# Patient Record
Sex: MALE | Race: WHITE | NOT HISPANIC OR LATINO | URBAN - METROPOLITAN AREA
[De-identification: names, ages, dates, MRNs, and addresses within clinical notes are randomized per-mention and may not be internally consistent; named-entity substitution may affect disease eponyms.]

---

## 2019-03-15 ENCOUNTER — OFFICE (OUTPATIENT)
Dept: URBAN - METROPOLITAN AREA CLINIC 75 | Facility: CLINIC | Age: 39
End: 2019-03-15
Payer: COMMERCIAL

## 2019-03-15 VITALS
WEIGHT: 157 LBS | HEIGHT: 69 IN | DIASTOLIC BLOOD PRESSURE: 68 MMHG | HEART RATE: 95 BPM | SYSTOLIC BLOOD PRESSURE: 106 MMHG

## 2019-03-15 DIAGNOSIS — K21.9 GASTRO-ESOPHAGEAL REFLUX DISEASE WITHOUT ESOPHAGITIS: ICD-10-CM

## 2019-03-15 DIAGNOSIS — R19.7 DIARRHEA, UNSPECIFIED: ICD-10-CM

## 2019-03-15 DIAGNOSIS — R14.3 FLATULENCE: ICD-10-CM

## 2019-03-15 DIAGNOSIS — R19.4 CHANGE IN BOWEL HABIT: ICD-10-CM

## 2019-03-15 PROCEDURE — 99204 OFFICE O/P NEW MOD 45 MIN: CPT | Performed by: INTERNAL MEDICINE

## 2019-12-31 ENCOUNTER — APPOINTMENT (OUTPATIENT)
Dept: GENERAL RADIOLOGY | Facility: HOSPITAL | Age: 39
End: 2019-12-31

## 2019-12-31 ENCOUNTER — HOSPITAL ENCOUNTER (EMERGENCY)
Facility: HOSPITAL | Age: 39
Discharge: HOME | End: 2019-12-31
Attending: EMERGENCY MEDICINE | Admitting: EMERGENCY MEDICINE

## 2019-12-31 VITALS
TEMPERATURE: 97.9 F | RESPIRATION RATE: 16 BRPM | SYSTOLIC BLOOD PRESSURE: 153 MMHG | DIASTOLIC BLOOD PRESSURE: 92 MMHG | HEART RATE: 90 BPM | OXYGEN SATURATION: 98 %

## 2019-12-31 DIAGNOSIS — R11.2 NON-INTRACTABLE VOMITING WITH NAUSEA, UNSPECIFIED VOMITING TYPE: ICD-10-CM

## 2019-12-31 DIAGNOSIS — E11.65 POORLY CONTROLLED DIABETES MELLITUS (HCC): ICD-10-CM

## 2019-12-31 DIAGNOSIS — R10.13 EPIGASTRIC ABDOMINAL PAIN: Primary | ICD-10-CM

## 2019-12-31 LAB
ALBUMIN SERPL-MCNC: 4.7 G/DL (ref 3.5–5.2)
ALBUMIN/GLOB SERPL: 1.4 G/DL
ALP SERPL-CCNC: 72 U/L (ref 39–117)
ALT SERPL W P-5'-P-CCNC: 10 U/L (ref 1–41)
ANION GAP SERPL CALCULATED.3IONS-SCNC: 16.1 MMOL/L (ref 5–15)
AST SERPL-CCNC: 11 U/L (ref 1–40)
BASOPHILS # BLD AUTO: 0.08 10*3/MM3 (ref 0–0.2)
BASOPHILS NFR BLD AUTO: 0.5 % (ref 0–1.5)
BILIRUB SERPL-MCNC: 1 MG/DL (ref 0.2–1.2)
BUN BLD-MCNC: 15 MG/DL (ref 6–20)
BUN/CREAT SERPL: 18.8 (ref 7–25)
CALCIUM SPEC-SCNC: 9.5 MG/DL (ref 8.6–10.5)
CHLORIDE SERPL-SCNC: 92 MMOL/L (ref 98–107)
CO2 SERPL-SCNC: 25.9 MMOL/L (ref 22–29)
CREAT BLD-MCNC: 0.8 MG/DL (ref 0.76–1.27)
DEPRECATED RDW RBC AUTO: 37 FL (ref 37–54)
EOSINOPHIL # BLD AUTO: 0.06 10*3/MM3 (ref 0–0.4)
EOSINOPHIL NFR BLD AUTO: 0.3 % (ref 0.3–6.2)
ERYTHROCYTE [DISTWIDTH] IN BLOOD BY AUTOMATED COUNT: 11.6 % (ref 12.3–15.4)
GFR SERPL CREATININE-BSD FRML MDRD: 107 ML/MIN/1.73
GLOBULIN UR ELPH-MCNC: 3.3 GM/DL
GLUCOSE BLD-MCNC: 289 MG/DL (ref 65–99)
HCT VFR BLD AUTO: 47.8 % (ref 37.5–51)
HGB BLD-MCNC: 16.6 G/DL (ref 13–17.7)
HOLD SPECIMEN: NORMAL
HOLD SPECIMEN: NORMAL
IMM GRANULOCYTES # BLD AUTO: 0.13 10*3/MM3 (ref 0–0.05)
IMM GRANULOCYTES NFR BLD AUTO: 0.7 % (ref 0–0.5)
LIPASE SERPL-CCNC: 11 U/L (ref 13–60)
LYMPHOCYTES # BLD AUTO: 1.7 10*3/MM3 (ref 0.7–3.1)
LYMPHOCYTES NFR BLD AUTO: 9.6 % (ref 19.6–45.3)
MCH RBC QN AUTO: 31 PG (ref 26.6–33)
MCHC RBC AUTO-ENTMCNC: 34.7 G/DL (ref 31.5–35.7)
MCV RBC AUTO: 89.2 FL (ref 79–97)
MONOCYTES # BLD AUTO: 1.36 10*3/MM3 (ref 0.1–0.9)
MONOCYTES NFR BLD AUTO: 7.7 % (ref 5–12)
NEUTROPHILS # BLD AUTO: 14.36 10*3/MM3 (ref 1.7–7)
NEUTROPHILS NFR BLD AUTO: 81.2 % (ref 42.7–76)
NRBC BLD AUTO-RTO: 0 /100 WBC (ref 0–0.2)
PLATELET # BLD AUTO: 174 10*3/MM3 (ref 140–450)
PMV BLD AUTO: 12.1 FL (ref 6–12)
POTASSIUM BLD-SCNC: 3.8 MMOL/L (ref 3.5–5.2)
PROT SERPL-MCNC: 8 G/DL (ref 6–8.5)
RBC # BLD AUTO: 5.36 10*6/MM3 (ref 4.14–5.8)
SODIUM BLD-SCNC: 134 MMOL/L (ref 136–145)
TROPONIN T SERPL-MCNC: <0.01 NG/ML (ref 0–0.03)
TROPONIN T SERPL-MCNC: <0.01 NG/ML (ref 0–0.03)
WBC NRBC COR # BLD: 17.69 10*3/MM3 (ref 3.4–10.8)
WHOLE BLOOD HOLD SPECIMEN: NORMAL
WHOLE BLOOD HOLD SPECIMEN: NORMAL

## 2019-12-31 PROCEDURE — 84484 ASSAY OF TROPONIN QUANT: CPT

## 2019-12-31 PROCEDURE — 93010 ELECTROCARDIOGRAM REPORT: CPT | Performed by: INTERNAL MEDICINE

## 2019-12-31 PROCEDURE — 93005 ELECTROCARDIOGRAM TRACING: CPT

## 2019-12-31 PROCEDURE — 25010000002 ONDANSETRON PER 1 MG: Performed by: EMERGENCY MEDICINE

## 2019-12-31 PROCEDURE — 96374 THER/PROPH/DIAG INJ IV PUSH: CPT

## 2019-12-31 PROCEDURE — 36415 COLL VENOUS BLD VENIPUNCTURE: CPT

## 2019-12-31 PROCEDURE — 96375 TX/PRO/DX INJ NEW DRUG ADDON: CPT

## 2019-12-31 PROCEDURE — 84484 ASSAY OF TROPONIN QUANT: CPT | Performed by: EMERGENCY MEDICINE

## 2019-12-31 PROCEDURE — 99283 EMERGENCY DEPT VISIT LOW MDM: CPT

## 2019-12-31 PROCEDURE — 80053 COMPREHEN METABOLIC PANEL: CPT

## 2019-12-31 PROCEDURE — 71046 X-RAY EXAM CHEST 2 VIEWS: CPT

## 2019-12-31 PROCEDURE — 93005 ELECTROCARDIOGRAM TRACING: CPT | Performed by: EMERGENCY MEDICINE

## 2019-12-31 PROCEDURE — 85025 COMPLETE CBC W/AUTO DIFF WBC: CPT

## 2019-12-31 PROCEDURE — 83690 ASSAY OF LIPASE: CPT | Performed by: EMERGENCY MEDICINE

## 2019-12-31 RX ORDER — FAMOTIDINE 10 MG/ML
20 INJECTION, SOLUTION INTRAVENOUS ONCE
Status: COMPLETED | OUTPATIENT
Start: 2019-12-31 | End: 2019-12-31

## 2019-12-31 RX ORDER — SODIUM CHLORIDE 0.9 % (FLUSH) 0.9 %
10 SYRINGE (ML) INJECTION AS NEEDED
Status: DISCONTINUED | OUTPATIENT
Start: 2019-12-31 | End: 2019-12-31 | Stop reason: HOSPADM

## 2019-12-31 RX ORDER — LIDOCAINE HYDROCHLORIDE 20 MG/ML
15 SOLUTION OROPHARYNGEAL ONCE
Status: COMPLETED | OUTPATIENT
Start: 2019-12-31 | End: 2019-12-31

## 2019-12-31 RX ORDER — ASPIRIN 325 MG
325 TABLET ORAL ONCE
Status: DISCONTINUED | OUTPATIENT
Start: 2019-12-31 | End: 2019-12-31 | Stop reason: HOSPADM

## 2019-12-31 RX ORDER — ONDANSETRON 2 MG/ML
4 INJECTION INTRAMUSCULAR; INTRAVENOUS ONCE
Status: COMPLETED | OUTPATIENT
Start: 2019-12-31 | End: 2019-12-31

## 2019-12-31 RX ORDER — ALUMINA, MAGNESIA, AND SIMETHICONE 2400; 2400; 240 MG/30ML; MG/30ML; MG/30ML
15 SUSPENSION ORAL ONCE
Status: COMPLETED | OUTPATIENT
Start: 2019-12-31 | End: 2019-12-31

## 2019-12-31 RX ORDER — METOCLOPRAMIDE 5 MG/1
5 TABLET ORAL 3 TIMES DAILY PRN
Qty: 30 TABLET | Refills: 0 | Status: SHIPPED | OUTPATIENT
Start: 2019-12-31 | End: 2020-04-18

## 2019-12-31 RX ADMIN — FAMOTIDINE 20 MG: 10 INJECTION INTRAVENOUS at 14:36

## 2019-12-31 RX ADMIN — SODIUM CHLORIDE 1000 ML: 9 INJECTION, SOLUTION INTRAVENOUS at 14:33

## 2019-12-31 RX ADMIN — ONDANSETRON 4 MG: 2 INJECTION INTRAMUSCULAR; INTRAVENOUS at 14:36

## 2019-12-31 RX ADMIN — ALUMINUM HYDROXIDE, MAGNESIUM HYDROXIDE, AND DIMETHICONE: 400; 400; 40 SUSPENSION ORAL at 15:52

## 2019-12-31 RX ADMIN — LIDOCAINE HYDROCHLORIDE 15 ML: 20 SOLUTION ORAL; TOPICAL at 15:52

## 2022-03-16 ENCOUNTER — INPATIENT HOSPITAL (OUTPATIENT)
Dept: URBAN - METROPOLITAN AREA HOSPITAL 107 | Facility: HOSPITAL | Age: 42
End: 2022-03-16
Payer: COMMERCIAL

## 2022-03-16 DIAGNOSIS — K21.00 GASTRO-ESOPHAGEAL REFLUX DISEASE WITH ESOPHAGITIS, WITHOUT B: ICD-10-CM

## 2022-03-16 DIAGNOSIS — K22.10 ULCER OF ESOPHAGUS WITHOUT BLEEDING: ICD-10-CM

## 2022-03-16 DIAGNOSIS — K29.80 DUODENITIS WITHOUT BLEEDING: ICD-10-CM

## 2022-03-16 DIAGNOSIS — K29.50 UNSPECIFIED CHRONIC GASTRITIS WITHOUT BLEEDING: ICD-10-CM

## 2022-03-16 DIAGNOSIS — R13.10 DYSPHAGIA, UNSPECIFIED: ICD-10-CM

## 2022-03-16 DIAGNOSIS — K44.9 DIAPHRAGMATIC HERNIA WITHOUT OBSTRUCTION OR GANGRENE: ICD-10-CM

## 2022-03-16 PROCEDURE — 43249 ESOPH EGD DILATION <30 MM: CPT | Performed by: INTERNAL MEDICINE

## 2022-03-16 PROCEDURE — 43239 EGD BIOPSY SINGLE/MULTIPLE: CPT | Performed by: INTERNAL MEDICINE

## 2023-01-10 ENCOUNTER — HOSPITAL ENCOUNTER (EMERGENCY)
Facility: HOSPITAL | Age: 43
Discharge: HOME | End: 2023-01-10
Attending: EMERGENCY MEDICINE | Admitting: EMERGENCY MEDICINE
Payer: COMMERCIAL

## 2023-01-10 ENCOUNTER — APPOINTMENT (OUTPATIENT)
Dept: GENERAL RADIOLOGY | Facility: HOSPITAL | Age: 43
End: 2023-01-10
Payer: COMMERCIAL

## 2023-01-10 VITALS
WEIGHT: 145 LBS | OXYGEN SATURATION: 99 % | TEMPERATURE: 97.1 F | SYSTOLIC BLOOD PRESSURE: 136 MMHG | HEIGHT: 69 IN | RESPIRATION RATE: 17 BRPM | DIASTOLIC BLOOD PRESSURE: 84 MMHG | HEART RATE: 90 BPM | BODY MASS INDEX: 21.48 KG/M2

## 2023-01-10 DIAGNOSIS — M25.511 ACUTE PAIN OF RIGHT SHOULDER: Primary | ICD-10-CM

## 2023-01-10 PROCEDURE — 99282 EMERGENCY DEPT VISIT SF MDM: CPT

## 2023-01-10 PROCEDURE — 73030 X-RAY EXAM OF SHOULDER: CPT

## 2023-01-10 RX ORDER — MELOXICAM 15 MG/1
15 TABLET ORAL DAILY
Qty: 15 TABLET | Refills: 0 | Status: SHIPPED | OUTPATIENT
Start: 2023-01-10

## 2023-01-10 NOTE — DISCHARGE INSTRUCTIONS
Ice the area of pain 2-3 times daily for 15 minutes  Take the medication as prescribed  Follow-up with orthopedics

## 2023-01-10 NOTE — ED PROVIDER NOTES
MD ATTESTATION NOTE    The SHER and I have discussed this patient's history, physical exam, and treatment plan.  I have reviewed the documentation and personally had a face to face interaction with the patient. I affirm the documentation and agree with the treatment and plan.  The attached note describes my personal findings.      I provided a substantive portion of the care of the patient.  I personally performed the physical exam in its entirety, and below are my findings.  For this patient encounter, the patient wore surgical mask, I wore full protective PPE including N95 and eye protection    Brief HPI: 42-year-old male who presents to the emergency room today for 2 months of right shoulder pain that radiates down to his right wrist.  He states he has had an x-ray that has been negative.  He states the pain has become progressively worse.  The patient denies weakness, numbness, tingling or difficulty moving.  The patient denies known injury.  The patient denies fevers or chills.    General : 42-year-old patient is awake alert and oriented in no acute distress  HEENT: NCAT  Ext: No acute abnormalities: No deformity: The patient has mild right anterior shoulder tenderness that is worsened with movement.  There is no swelling of the joint, redness or fevers or chills patient is neurovascular intact in his right hand.  Skin: No rash  Neuro: Cranial nerves II through XII grossly intact as tested.  No acute lateralizing deficits.  Psych: Normal mood and affect      Plan: Obtain x-rays    Patient's x-rays are unremarkable    We will treat the patient with NSAIDs, sling and orthopedics follow-up as an outpatient.  We have discussed the above with the patient and he understands and agrees the plan.       Cisco Malcolm MD  01/10/23 8347

## 2023-01-10 NOTE — ED PROVIDER NOTES
EMERGENCY DEPARTMENT ENCOUNTER    Room Number:  S01/01  Date seen:  1/10/2023  PCP: Provider, No Known  Discussed/ obtained information from independent historians: Patient      HPI:  Chief Complaint: Right shoulder pain  A complete HPI/ROS/PMH/PSH/SH/FH are unobtainable due to: None  Context: Raghav Loco is a 42 y.o. male who presents to the ED c/o 3-month history of right shoulder pain that is worse with range of motion and radiates down his arm.  Is not exertional or pleuritic.  He states he was seen a couple weeks ago at another ER and had x-rays which were normal.  He states he was prescribed no medications and has not followed up.  His girlfriend is currently admitted to the hospital here and while he was here visiting her decided he would have a shoulder recheck.  He denies any numbness tingling or weakness, any trauma.  He receives disability, is unemployed, no repetitive motions or heavy lifting.  Denies chest pain or shortness of breath.      External (non-ED) record review: Patient evaluated at [facility] for right shoulder pain on 11/10/2022.  X-rays were unremarkable and he was prescribed a 2-week supply of naproxen 500 mg.      PAST MEDICAL HISTORY  Active Ambulatory Problems     Diagnosis Date Noted   • No Active Ambulatory Problems     Resolved Ambulatory Problems     Diagnosis Date Noted   • No Resolved Ambulatory Problems     Past Medical History:   Diagnosis Date   • Coronary artery disease    • Diabetes mellitus (CMS/HCC)          PAST SURGICAL HISTORY  Past Surgical History:   Procedure Laterality Date   • CORONARY ANGIOPLASTY WITH STENT PLACEMENT           FAMILY HISTORY  No family history on file.      SOCIAL HISTORY  Social History     Socioeconomic History   • Marital status: Single   Tobacco Use   • Smoking status: Every Day   • Smokeless tobacco: Never   Substance and Sexual Activity   • Alcohol use: Never   • Drug use: Never          ALLERGIES  Patient has no known allergies.        REVIEW OF SYSTEMS  Review of Systems         PHYSICAL EXAM  ED Triage Vitals [01/10/23 1527]   Temp Heart Rate Resp BP SpO2   97.1 °F (36.2 °C) 90 17 136/84 99 %      Temp src Heart Rate Source Patient Position BP Location FiO2 (%)   -- -- -- -- --       Physical Exam      GENERAL: no acute distress well-appearing  HENT: normocephalic, atraumatic  EYES: no scleral icterus  CV: regular rhythm, normal rate  RESPIRATORY: normal effort, CTA B  ABDOMEN: nondistended  MUSCULOSKELETAL: no deformity.  No tenderness can be elicited about the right shoulder on examination.  He has pain with cross body abduction and internal rotation, external rotation is much more comfortable. Sensation is intact to light touch in radial, ulnar, and median nerve distributions. There is strong finger abduction, thumb and pinky adduction, and wrist extension. Radial and ulnar pulses are 2+ and equal bilaterally.    NEURO: alert, moves all extremities, follows commands  PSYCH:  calm, cooperative  SKIN: warm, dry    Vital signs and nursing notes reviewed.        RADIOLOGY  XR Shoulder 2+ View Right    Result Date: 1/10/2023  XR SHOULDER 2+ VW RIGHT-  INDICATIONS: Shoulder pain  TECHNIQUE: 2 views of the right shoulder  COMPARISON: None available  FINDINGS:  No acute fracture, erosion, or dislocation is identified. Follow-up/further evaluation can be obtained as patient persist.       As described.  This report was finalized on 1/10/2023 3:45 PM by Dr. Matteo Guzman M.D.        Ordered the above noted radiological studies. Reviewed by me in PACS.            PROCEDURES  Procedures              MEDICATIONS GIVEN IN ER  Medications - No data to display                MEDICAL DECISION MAKING, PROGRESS, and CONSULTS    All labs have been independently reviewed by me.  All radiology studies have been reviewed by me and I have also reviewed the radiology report.   EKG's independently viewed  and interpreted by me.  Discussion below represents my analysis of pertinent findings related to patient's condition, differential diagnosis, treatment plan and final disposition.      Additional sources:    - Chronic or social conditions impacting care: none      Orders placed during this visit:  Orders Placed This Encounter   Procedures   • XR Shoulder 2+ View Right         Differential diagnosis:  Tendinitis, rotator cuff pathology, muscle strain, joint sprain, osteoarthritis, bursitis      Independent interpretation of labs, radiology studies, and discussions with consultants:  ED Course as of 01/10/23 1755   Tue Erick 10, 2023   1746 My independent interpretation of the right shoulder x-rays is no acute fracture or dislocation [KA]   1746 Reassessed the patient, counseled him about his x-ray results.  I recommended an anti-inflammatory and will prescribe a course of Mobic.  Additionally he should follow-up with orthopedics for definitive treatment of this problem.  I suspect a tendinitis, could have a rotator cuff injury with the way it radiates down his arm.  It is reproducible with range of motion suggestive of a musculoskeletal source.  He is agreeable with the plan and stable for discharge. [KA]      ED Course User Index  [KA] Azucena Swenson PA             Patient was wearing a face mask when I entered the room and they continued to wear a mask throughout their stay in the ED.  I wore PPE, including  gloves, face mask with shield or face mask with goggles whenever I was in the room with patient.     DIAGNOSIS  Final diagnoses:   Acute pain of right shoulder           Follow Up:  Vinnie Rodrigez II, MD  [address]  [place] [address]  [phone]    Schedule an appointment as soon as possible for a visit in 1 week        RX:     Medication List      New Prescriptions    meloxicam 15 MG tablet  Commonly known as: MOBIC  Take 1 tablet by mouth Daily.           Where to Get Your  Medications      These medications were sent to [facility] [id number] - [place], [place] - [address] - [phone] PH - [phone] FX  [address], [facility] [address]    Phone: [phone]   · meloxicam 15 MG tablet         Latest Documented Vital Signs:  As of 17:55 EST  BP- 136/84 HR- 90 Temp- 97.1 °F (36.2 °C) O2 sat- 99%              --    Please note that portions of this were completed with a voice recognition program.       Note Disclaimer: At [facility], we believe that sharing information builds trust and better relationships. You are receiving this note because you are receiving care at [facility] or recently visited. It is possible you will see health information before a provider has talked with you about it. This kind of information can be easy to misunderstand. To help you fully understand what it means for your health, we urge you to discuss this note with your provider.           Azucena Swenson PA  01/10/23 3153

## 2023-01-10 NOTE — ED TRIAGE NOTES
Pt to er via pv with c/o right shoulder that radiates down arm to wrist x2 months. Pt states he has already gotten xray that didn't show anything wrong. Pt states pain is starting to become unbearable.     Pt and RN wearing mask throughout encounter.

## 2024-04-03 ENCOUNTER — APPOINTMENT (OUTPATIENT)
Dept: CT IMAGING | Facility: HOSPITAL | Age: 44
DRG: 064 | End: 2024-04-03
Payer: COMMERCIAL

## 2024-04-03 ENCOUNTER — HOSPITAL ENCOUNTER (INPATIENT)
Facility: HOSPITAL | Age: 44
LOS: 4 days | Discharge: HOME | DRG: 064 | End: 2024-04-07
Attending: EMERGENCY MEDICINE | Admitting: INTERNAL MEDICINE
Payer: COMMERCIAL

## 2024-04-03 DIAGNOSIS — I63.9 CEREBELLAR STROKE, ACUTE: Primary | ICD-10-CM

## 2024-04-03 LAB
ALBUMIN SERPL-MCNC: 4.7 G/DL (ref 3.5–5.2)
ALBUMIN/GLOB SERPL: 1.7 G/DL
ALP SERPL-CCNC: 66 U/L (ref 39–117)
ALT SERPL W P-5'-P-CCNC: 15 U/L (ref 1–41)
ANION GAP SERPL CALCULATED.3IONS-SCNC: 9.4 MMOL/L (ref 5–15)
APTT PPP: 26.9 SECONDS (ref 22.7–35.4)
AST SERPL-CCNC: 11 U/L (ref 1–40)
BASOPHILS # BLD AUTO: 0.09 10*3/MM3 (ref 0–0.2)
BASOPHILS NFR BLD AUTO: 1 % (ref 0–1.5)
BILIRUB SERPL-MCNC: 0.7 MG/DL (ref 0–1.2)
BUN SERPL-MCNC: 12 MG/DL (ref 6–20)
BUN/CREAT SERPL: 13.6 (ref 7–25)
CALCIUM SPEC-SCNC: 9.5 MG/DL (ref 8.6–10.5)
CHLORIDE SERPL-SCNC: 102 MMOL/L (ref 98–107)
CO2 SERPL-SCNC: 22.6 MMOL/L (ref 22–29)
CREAT SERPL-MCNC: 0.88 MG/DL (ref 0.76–1.27)
DEPRECATED RDW RBC AUTO: 38.9 FL (ref 37–54)
EGFRCR SERPLBLD CKD-EPI 2021: 108.1 ML/MIN/1.73
EOSINOPHIL # BLD AUTO: 0.34 10*3/MM3 (ref 0–0.4)
EOSINOPHIL NFR BLD AUTO: 3.7 % (ref 0.3–6.2)
ERYTHROCYTE [DISTWIDTH] IN BLOOD BY AUTOMATED COUNT: 11.8 % (ref 12.3–15.4)
FLUAV RNA RESP QL NAA+PROBE: NOT DETECTED
FLUBV RNA RESP QL NAA+PROBE: NOT DETECTED
GLOBULIN UR ELPH-MCNC: 2.8 GM/DL
GLUCOSE BLDC GLUCOMTR-MCNC: 215 MG/DL (ref 70–130)
GLUCOSE BLDC GLUCOMTR-MCNC: 268 MG/DL (ref 70–130)
GLUCOSE SERPL-MCNC: 259 MG/DL (ref 65–99)
HCT VFR BLD AUTO: 48.8 % (ref 37.5–51)
HGB BLD-MCNC: 16.2 G/DL (ref 13–17.7)
IMM GRANULOCYTES # BLD AUTO: 0.03 10*3/MM3 (ref 0–0.05)
IMM GRANULOCYTES NFR BLD AUTO: 0.3 % (ref 0–0.5)
INR PPP: 1.1 (ref 0.9–1.1)
LYMPHOCYTES # BLD AUTO: 1.79 10*3/MM3 (ref 0.7–3.1)
LYMPHOCYTES NFR BLD AUTO: 19.3 % (ref 19.6–45.3)
MAGNESIUM SERPL-MCNC: 2.1 MG/DL (ref 1.6–2.6)
MCH RBC QN AUTO: 29.9 PG (ref 26.6–33)
MCHC RBC AUTO-ENTMCNC: 33.2 G/DL (ref 31.5–35.7)
MCV RBC AUTO: 90.2 FL (ref 79–97)
MONOCYTES # BLD AUTO: 1.06 10*3/MM3 (ref 0.1–0.9)
MONOCYTES NFR BLD AUTO: 11.4 % (ref 5–12)
NEUTROPHILS NFR BLD AUTO: 5.96 10*3/MM3 (ref 1.7–7)
NEUTROPHILS NFR BLD AUTO: 64.3 % (ref 42.7–76)
NRBC BLD AUTO-RTO: 0 /100 WBC (ref 0–0.2)
PLATELET # BLD AUTO: 170 10*3/MM3 (ref 140–450)
PMV BLD AUTO: 12.2 FL (ref 6–12)
POTASSIUM SERPL-SCNC: 3.7 MMOL/L (ref 3.5–5.2)
PROT SERPL-MCNC: 7.5 G/DL (ref 6–8.5)
PROTHROMBIN TIME: 14.5 SECONDS (ref 11.7–14.2)
QT INTERVAL: 387 MS
QTC INTERVAL: 430 MS
RBC # BLD AUTO: 5.41 10*6/MM3 (ref 4.14–5.8)
RSV RNA RESP QL NAA+PROBE: NOT DETECTED
SARS-COV-2 RNA RESP QL NAA+PROBE: NOT DETECTED
SODIUM SERPL-SCNC: 134 MMOL/L (ref 136–145)
WBC NRBC COR # BLD AUTO: 9.27 10*3/MM3 (ref 3.4–10.8)

## 2024-04-03 PROCEDURE — 80053 COMPREHEN METABOLIC PANEL: CPT | Performed by: PHYSICIAN ASSISTANT

## 2024-04-03 PROCEDURE — 25010000002 METOCLOPRAMIDE PER 10 MG: Performed by: PHYSICIAN ASSISTANT

## 2024-04-03 PROCEDURE — 99291 CRITICAL CARE FIRST HOUR: CPT

## 2024-04-03 PROCEDURE — 85730 THROMBOPLASTIN TIME PARTIAL: CPT | Performed by: PHYSICIAN ASSISTANT

## 2024-04-03 PROCEDURE — 87637 SARSCOV2&INF A&B&RSV AMP PRB: CPT | Performed by: PHYSICIAN ASSISTANT

## 2024-04-03 PROCEDURE — 93005 ELECTROCARDIOGRAM TRACING: CPT | Performed by: EMERGENCY MEDICINE

## 2024-04-03 PROCEDURE — 83735 ASSAY OF MAGNESIUM: CPT | Performed by: PHYSICIAN ASSISTANT

## 2024-04-03 PROCEDURE — 70496 CT ANGIOGRAPHY HEAD: CPT

## 2024-04-03 PROCEDURE — 70498 CT ANGIOGRAPHY NECK: CPT

## 2024-04-03 PROCEDURE — 25010000002 DIPHENHYDRAMINE PER 50 MG: Performed by: PHYSICIAN ASSISTANT

## 2024-04-03 PROCEDURE — 93010 ELECTROCARDIOGRAM REPORT: CPT | Performed by: INTERNAL MEDICINE

## 2024-04-03 PROCEDURE — 85610 PROTHROMBIN TIME: CPT | Performed by: PHYSICIAN ASSISTANT

## 2024-04-03 PROCEDURE — 85025 COMPLETE CBC W/AUTO DIFF WBC: CPT | Performed by: PHYSICIAN ASSISTANT

## 2024-04-03 PROCEDURE — 82948 REAGENT STRIP/BLOOD GLUCOSE: CPT

## 2024-04-03 PROCEDURE — 25010000002 KETOROLAC TROMETHAMINE PER 15 MG: Performed by: PHYSICIAN ASSISTANT

## 2024-04-03 PROCEDURE — 70450 CT HEAD/BRAIN W/O DYE: CPT

## 2024-04-03 PROCEDURE — 99255 IP/OBS CONSLTJ NEW/EST HI 80: CPT

## 2024-04-03 PROCEDURE — 25510000001 IOPAMIDOL PER 1 ML: Performed by: INTERNAL MEDICINE

## 2024-04-03 RX ORDER — SODIUM CHLORIDE 9 MG/ML
40 INJECTION, SOLUTION INTRAVENOUS AS NEEDED
Status: DISCONTINUED | OUTPATIENT
Start: 2024-04-03 | End: 2024-04-07 | Stop reason: HOSPADM

## 2024-04-03 RX ORDER — ASPIRIN 325 MG
325 TABLET ORAL DAILY
Status: DISCONTINUED | OUTPATIENT
Start: 2024-04-03 | End: 2024-04-06

## 2024-04-03 RX ORDER — METOCLOPRAMIDE HYDROCHLORIDE 5 MG/ML
10 INJECTION INTRAMUSCULAR; INTRAVENOUS ONCE
Status: COMPLETED | OUTPATIENT
Start: 2024-04-03 | End: 2024-04-03

## 2024-04-03 RX ORDER — ASPIRIN 300 MG/1
300 SUPPOSITORY RECTAL DAILY
Status: DISCONTINUED | OUTPATIENT
Start: 2024-04-03 | End: 2024-04-06

## 2024-04-03 RX ORDER — DIPHENHYDRAMINE HYDROCHLORIDE 50 MG/ML
25 INJECTION INTRAMUSCULAR; INTRAVENOUS ONCE
Status: COMPLETED | OUTPATIENT
Start: 2024-04-03 | End: 2024-04-03

## 2024-04-03 RX ORDER — ATORVASTATIN CALCIUM 80 MG/1
80 TABLET, FILM COATED ORAL NIGHTLY
Status: DISCONTINUED | OUTPATIENT
Start: 2024-04-03 | End: 2024-04-07 | Stop reason: HOSPADM

## 2024-04-03 RX ORDER — SODIUM CHLORIDE 0.9 % (FLUSH) 0.9 %
10 SYRINGE (ML) INJECTION AS NEEDED
Status: DISCONTINUED | OUTPATIENT
Start: 2024-04-03 | End: 2024-04-07 | Stop reason: HOSPADM

## 2024-04-03 RX ORDER — SODIUM CHLORIDE 0.9 % (FLUSH) 0.9 %
10 SYRINGE (ML) INJECTION EVERY 12 HOURS SCHEDULED
Status: DISCONTINUED | OUTPATIENT
Start: 2024-04-03 | End: 2024-04-07 | Stop reason: HOSPADM

## 2024-04-03 RX ORDER — KETOROLAC TROMETHAMINE 30 MG/ML
30 INJECTION, SOLUTION INTRAMUSCULAR; INTRAVENOUS ONCE
Status: COMPLETED | OUTPATIENT
Start: 2024-04-03 | End: 2024-04-03

## 2024-04-03 RX ADMIN — Medication 10 ML: at 20:02

## 2024-04-03 RX ADMIN — KETOROLAC TROMETHAMINE 30 MG: 30 INJECTION, SOLUTION INTRAMUSCULAR at 14:44

## 2024-04-03 RX ADMIN — IOPAMIDOL 95 ML: 755 INJECTION, SOLUTION INTRAVENOUS at 17:50

## 2024-04-03 RX ADMIN — DIPHENHYDRAMINE HYDROCHLORIDE 25 MG: 50 INJECTION, SOLUTION INTRAMUSCULAR; INTRAVENOUS at 16:02

## 2024-04-03 RX ADMIN — ASPIRIN 325 MG: 325 TABLET ORAL at 20:00

## 2024-04-03 RX ADMIN — ATORVASTATIN CALCIUM 80 MG: 80 TABLET, FILM COATED ORAL at 20:01

## 2024-04-03 RX ADMIN — METOCLOPRAMIDE 10 MG: 5 INJECTION, SOLUTION INTRAMUSCULAR; INTRAVENOUS at 16:02

## 2024-04-03 NOTE — PLAN OF CARE
Goal Outcome Evaluation:      Pt admitted from ER. NIHSS 0. Denies H/A. Passed [facility] swallow eval, taking reg diet without difficulty. Using urinal independently. Significant other @ [facility].

## 2024-04-03 NOTE — H&P
Patient Care Team:  Provider, No Known as PCP - General    Chief complaint:  Dizziness    History of present illness:  Subjective     This is a 43 year old male patient, CAD s/p stents, HTN, DM II, non compliance.     He reported feeling dizzy described as the room spinning around him, since Nils 3/31 and had associated  posterior headache since then, reaching 10/10 in intensity today.  It was not elevated by the use of ibuprofen.  No exacerbating factors.  His dizziness improved but returned again today along with worsening headache prompting visit to the ED..  He did have an episode of nausea on Monday but no vomiting.  On presentation to the ED, had normal vitals.  Due to his symptoms a CT of the brain was performed and revealed Acute or subacute left cerebellar stroke with mass effect and compression of the fourth ventricle but no hydrocephalus.  Neurosurgery team was contacted and they did not think intervention would be necessary.    We are asked to admit to the intensive care unit for monitoring.    Apparently patient has not been taking his insulin and BP medications for 2 months.  He stated that he moved to a new house few months ago and has not really had the time to take care of his health issues.  He also smokes 1 pack of cigarettes daily and has been smoking for 30 years.    Review of Systems:  Constitutional: No fever or chills.   ENMT: No sinus congestion  Cardiovascular: No chest pain, palpitation or legs swelling.    Respiratory: No dyspnea, cough or wheezing.  Gastrointestinal: No constipation, diarrhea or abdominal pain.  No current nausea or vomiting.  Neurology: See above.  No visual disturbance.  No weakness or numbness.  Musculoskeletal: No joints pain, stiffness or swelling.   Psychiatry: No depression.  Genitourinary: No dysuria or frequent urination  Endo: No weight changes. No cold or warm intolerance.  Lymphatic: No swollen glands.  Integumentary: No rash.    History  Past  Medical History:   Diagnosis Date    Coronary artery disease     Diabetes mellitus      Past Surgical History:   Procedure Laterality Date    CORONARY ANGIOPLASTY WITH STENT PLACEMENT       History reviewed. No pertinent family history.  Social History     Tobacco Use    Smoking status: Every Day     Current packs/day: 1.00     Types: Cigarettes    Smokeless tobacco: Never   Substance Use Topics    Alcohol use: Never    Drug use: Never     E-cigarette/Vaping     E-cigarette/Vaping Substances     Medications Prior to Admission   Medication Sig Dispense Refill Last Dose    meloxicam (MOBIC) 15 MG tablet Take 1 tablet by mouth Daily. 15 tablet 0     ondansetron (ZOFRAN) 4 MG tablet Take 1 tablet by mouth.        Allergies:  Patient has no known allergies.    Objective   Vital Signs  Temp:  [96.7 °F (35.9 °C)] 96.7 °F (35.9 °C)  Heart Rate:  [] 93  Resp:  [16] 16  BP: (123-135)/(85-90) 132/89    PPE used per hospital policy    Physical Exam:  Constitutional: Not in acute distress.  Eyes: Injected conjunctivae, EOMI. Pupils equal and reactive to light.   ENMT: Knight 3. No oral thrush.  Moist tongue.  Neck: No thyromegaly.  Trachea midline  Heart: RRR, no murmur.  No pitting edema  Lungs: Good and equal air entry bilaterally.  Nonlabored breathing  Abdomen: Obese. Soft. No tenderness or dullness.  Positive bowel sounds  Extremities: No cyanosis, clubbing. Moves all extremities.  Warm extremities and well-perfused  Neuro: Conscious, alert, oriented x3.  Strength 5/5 in arms and legs.  Visual field intact.  Finger-to-nose test normal.  Psych: Appropriate mood and affect.    Integumentary: No rash or ecchymosis  Lymphatic: No palpable cervical or supraclavicular lymph nodes.      Diagnostic imaging:  I personally and independently reviewed the following images:   CT brain 4/3/24: Left cerebellar stroke with brain compression.  Findings summarized below in assessment.      Laboratory workup:  Results from last 7 days    Lab Units 04/03/24  1600   SODIUM mmol/L 134*   POTASSIUM mmol/L 3.7   CHLORIDE mmol/L 102   CO2 mmol/L 22.6   BUN mg/dL 12   CREATININE mg/dL 0.88   GLUCOSE mg/dL 259*   CALCIUM mg/dL 9.5         Results from last 7 days   Lab Units 04/03/24  1600   WBC 10*3/mm3 9.27   HEMOGLOBIN g/dL 16.2   HEMATOCRIT % 48.8   PLATELETS 10*3/mm3 170     Results from last 7 days   Lab Units 04/03/24  1600   INR  1.10   APTT seconds 26.9           Assessment     Acute/subacute left cerebellar stroke, left PICA distribution, 4.9 x 2.9 cm  Brain compression with mass effect and compression of the fourth ventricle but no hydrocephalus.    Uncontrolled DM type II, A1c 10.5 on 11/30/2023  HTN  Noncompliance with medical therapy        Plan:  Admit to ICU  Neurocheck per protocol  Check CTA head and neck.  CT brain in a.m. and as needed if change in neurostatus.  Watch for signs of increased intracranial pressure such as nausea/vomiting, regular aspiration and bradycardia.  Check A1c.  And lipid profile.  Initiate insulin subcu as needed for hyperglycemia.  PT OT.        Lavelle Mack MD  04/03/24  16:44 EDT    Time: Critical care 35 min      This note was dictated utilizing Dragon dictation

## 2024-04-03 NOTE — ED TRIAGE NOTES
Patient to ED via PV from home. Patient c/o headache and tingling in left hand. Patient reports he also had a dizzy spell on Sunday but that has resolved.

## 2024-04-03 NOTE — ED PROVIDER NOTES
Pt presents to the ED c/o acute onset of dizziness on Sunday night with posterior headache, has had some nasal congestion, has had some nausea and vomiting last on Monday.  No focal isolated numbness or weakness of the arms or legs.  Denies any balance issues.     On exam,   General: No acute distress, nontoxic  HEENT: Mucous membranes moist, atraumatic, EOMI  Neck: Full ROM  Pulm: Symmetric chest rise, nonlabored, lungs CTAB  Cardiovascular: Regular rate and rhythm, intact distal pulses  GI: Soft, nontender, nondistended, no rebound, no guarding, bowel sounds present  MSK: Full ROM, no deformity  Skin: Warm, dry  Neuro: Awake, alert, oriented x 4, GCS 15, no facial droop or dysarthria or aphasia, moving all extremities, no focal deficits  Psych: Calm, cooperative      EKG - Per my independent interpretation at 1700:    EKG Time: 1654  Rhythm/Rate: Sinus rhythm with a rate of 74  Normal axis  Normal intervals  No acute ischemic changes  No STEMI       No emergent changes compared to December 31, 2019      Plan: Initial concern for viral process, intracranial hemorrhage, TIA or CVA, dehydration, renal failure, electrolyte normalities, among others.  Plan for labs, CT head, and reevaluation with results.    ED Course as of 04/03/24 1803   Wed Apr 03, 2024   1429 First Look: Patient presents with symptoms of upper respiratory congestion, diffuse headache that has progressed over the past couple of days.  He has attempted to use ibuprofen without much relief of symptoms.  No reported injury to the head or neck.  Does not take blood thinners.  No numbness or weakness of the face or extremities, slurred speech, visual disturbance, photophobia.  No sick contacts at home.  He does report that his headache is positional and worse when he bends over. [DC]   7936 CT Head Without Contrast  Per my independent interpretation of the CT head, patient has a left posterior area of encephalomalacia of unclear acuity, no obvious acute  intracranial hemorrhage.  Will defer timing and detailed report radiologist. [DC-2]   1555 Preliminary report per Dr. Benton is acute left cerebellar infarct with associated mass effect on the 4th ventricle [MP]   1558 Call placed to stroke neurology [MP]   1606 Dr. Forbes has reviewed CT scan. Recommending admission to ICU. Requesting consult to [facility].  [MP]   1641 I spoke with Tessa with [facility].  She and Dr. Bhatia have reviewed the CT imaging.  They are aware patient is being admitted to ICU and will be available for consult for any change in patient's mental status. [MP]   1642 WBC: 9.27 [DC-2]   1642 Hemoglobin: 16.2 [DC-2]   1642 Glucose(!): 259 [DC-2]   1642 BUN: 12 [DC-2]   1642 Creatinine: 0.88 [DC-2]   1642 Sodium(!): 134 [DC-2]   1642 Potassium: 3.7 [DC-2]   1642 ALT (SGPT): 15 [DC-2]   1642 AST (SGOT): 11 [DC-2]   1642 Alkaline Phosphatase: 66 [DC-2]   1642 Total Bilirubin: 0.7 [DC-2]   1642 RSV, PCR: Not Detected [DC-2]   1642 Influenza B PCR: Not Detected [DC-2]   1642 Influenza A PCR: Not Detected [DC-2]   1642 COVID19: Not Detected [DC-2]   1642 Magnesium: 2.1 [DC-2]   1645 I spoke with Dr. Mack with pulmonology.  Reviewed patient presentation and ED findings.  He agrees to admit patient to an ICU bed. [MP]      ED Course User Index  [DC] Gene Sandoval PA  [DC-2] Harinder Renee MD  [MP] Alissa Nieves PA-C       CT scan today shows an acute cerebellar stroke with edema and mass effect on the fourth ventricle.  Stroke neurology has been consulted, neurosurgery has been consulted per stroke neurology recommendations.  Patient is overall in no acute distress but understands need for intensive care unit stay per neurology recommendations.     MD Attestation Note    SHARED VISIT: This visit was performed by BOTH a physician and an APC. The substantive portion of the medical decision making was performed by this attesting physician who made or approved the management plan and takes responsibility for  patient management. All studies in the APC note (if performed) were independently interpreted by me.                   Harinder Renee MD  04/03/24 6915

## 2024-04-03 NOTE — CONSULTS
[facility]:  Called by ER provider at the request of neurology, Dr. Forbes, to review CTH.  Patient with acute left cerebellar stroke resulting in effacement of the fourth ventricle but no overt hydrocephalus.  May be some slight enlargement of temporal tips.  Patient is awake and alert with only complaint is headache per ER provider.  Discussed with Dr. Bhatia and Andrei.  Nothing acute from neurosurgical standpoint. Agree with ICU observation. Recommend repeat CT head in a.m. (or sooner for neurologic change) and maintain serum sodium >145.  Although we are not seeing the patient formally at this time, we are aware of and readily available if his condition warrants.  Neurology will notify us if needed.

## 2024-04-03 NOTE — ED PROVIDER NOTES
EMERGENCY DEPARTMENT ENCOUNTER  Room Number:  04/04  PCP: Provider, No Known  Independent Historians: Patient      HPI:  Chief Complaint: had concerns including Hypertension, Headache, and Eye Problem.     A complete HPI/ROS/PMH/PSH/SH/FH are unobtainable due to: None    Chronic or social conditions impacting patient care (Social Determinants of Health): None      Context: Raghav Loco is a 43 y.o. male with a medical history of diabetes, hypertension, hyperlipidemia, esophagitis, CAD who presents to the ED c/o acute headache.  Patient reports he had a lightheaded spell on Sunday and laid down for nap.  When he woke up he had posterior headache.  He describes this as a pressure.  Headache has been constant since onset.  Reports associated nasal congestion.  No known sick contacts.  No fall or head injury.  Patient is not anticoagulated.  He admits that he has been noncompliant and is not taking any of his medications.  Patient denies fever, syncope, chest pain, dyspnea, vision loss, unilateral numbness/weakness, or any other systemic complaint.      Review of prior external notes (non-ED) -and- Review of prior external test results outside of this encounter:  Patient seen in office by PCP on 1/17/2023 for diabetes, hypertension, hyperlipidemia.  Reviewed assessment and plan.  Will check labs, refill medications, referrals placed to GI, cardiology, orthopedics, patient will follow-up in 4 weeks.  Reviewed labs collected on 12/2/2023.  CBC with hemoglobin 13.0, BMP with creatinine 0.44.    Prescription drug monitoring program review:     N/A    PAST MEDICAL HISTORY  Active Ambulatory Problems     Diagnosis Date Noted    No Active Ambulatory Problems     Resolved Ambulatory Problems     Diagnosis Date Noted    No Resolved Ambulatory Problems     Past Medical History:   Diagnosis Date    Coronary artery disease     Diabetes mellitus          PAST SURGICAL HISTORY  Past Surgical History:   Procedure Laterality  Date    CORONARY ANGIOPLASTY WITH STENT PLACEMENT           FAMILY HISTORY  History reviewed. No pertinent family history.      SOCIAL HISTORY  Social History     Socioeconomic History    Marital status: Single   Tobacco Use    Smoking status: Every Day    Smokeless tobacco: Never   Substance and Sexual Activity    Alcohol use: Never    Drug use: Never         ALLERGIES  Patient has no known allergies.      REVIEW OF SYSTEMS  Review of Systems   Constitutional:  Negative for chills and fever.   HENT:  Positive for congestion. Negative for ear pain and sore throat.    Respiratory:  Negative for cough and shortness of breath.    Cardiovascular:  Negative for chest pain and palpitations.   Gastrointestinal:  Negative for abdominal pain and vomiting.   Genitourinary:  Negative for dysuria and hematuria.   Musculoskeletal:  Negative for arthralgias and joint swelling.   Skin:  Negative for pallor and rash.   Neurological:  Positive for headaches. Negative for syncope.   Psychiatric/Behavioral:  Negative for confusion and hallucinations.      Included in HPI  All systems reviewed and negative except for those discussed in HPI.      PHYSICAL EXAM    I have reviewed the triage vital signs and nursing notes.    ED Triage Vitals   Temp Heart Rate Resp BP SpO2   04/03/24 1328 04/03/24 1328 04/03/24 1328 04/03/24 1439 04/03/24 1328   96.7 °F (35.9 °C) 111 16 134/89 97 %      Temp src Heart Rate Source Patient Position BP Location FiO2 (%)   -- -- -- -- --              Physical Exam  Constitutional:       General: He is not in acute distress.     Appearance: Normal appearance.   HENT:      Head: Normocephalic and atraumatic.      Nose: Nose normal.      Mouth/Throat:      Mouth: Mucous membranes are moist.   Eyes:      Extraocular Movements: Extraocular movements intact.      Conjunctiva/sclera: Conjunctivae normal.      Pupils: Pupils are equal, round, and reactive to light.   Cardiovascular:      Rate and Rhythm: Normal rate  and regular rhythm.      Pulses: Normal pulses.      Heart sounds: Normal heart sounds.   Pulmonary:      Effort: Pulmonary effort is normal.      Breath sounds: Normal breath sounds.   Abdominal:      General: There is no distension.   Musculoskeletal:         General: Normal range of motion.      Cervical back: Normal range of motion and neck supple.   Skin:     General: Skin is warm.      Capillary Refill: Capillary refill takes less than 2 seconds.   Neurological:      General: No focal deficit present.      Mental Status: He is alert and oriented to person, place, and time.      Comments: Motor strength 5/5 all 4 extremities   Psychiatric:         Mood and Affect: Mood normal.           LAB RESULTS  Recent Results (from the past 24 hour(s))   COVID-19, FLU A/B, RSV PCR 1 HR TAT - Swab, Nasopharynx    Collection Time: 04/03/24  2:38 PM    Specimen: Nasopharynx; Swab   Result Value Ref Range    COVID19 Not Detected Not Detected - Ref. Range    Influenza A PCR Not Detected Not Detected    Influenza B PCR Not Detected Not Detected    RSV, PCR Not Detected Not Detected   Comprehensive Metabolic Panel    Collection Time: 04/03/24  4:00 PM    Specimen: Blood   Result Value Ref Range    Glucose 259 (H) 65 - 99 mg/dL    BUN 12 6 - 20 mg/dL    Creatinine 0.88 0.76 - 1.27 mg/dL    Sodium 134 (L) 136 - 145 mmol/L    Potassium 3.7 3.5 - 5.2 mmol/L    Chloride 102 98 - 107 mmol/L    CO2 22.6 22.0 - 29.0 mmol/L    Calcium 9.5 8.6 - 10.5 mg/dL    Total Protein 7.5 6.0 - 8.5 g/dL    Albumin 4.7 3.5 - 5.2 g/dL    ALT (SGPT) 15 1 - 41 U/L    AST (SGOT) 11 1 - 40 U/L    Alkaline Phosphatase 66 39 - 117 U/L    Total Bilirubin 0.7 0.0 - 1.2 mg/dL    Globulin 2.8 gm/dL    A/G Ratio 1.7 g/dL    BUN/Creatinine Ratio 13.6 7.0 - 25.0    Anion Gap 9.4 5.0 - 15.0 mmol/L    eGFR 108.1 >60.0 mL/min/1.73   Protime-INR    Collection Time: 04/03/24  4:00 PM    Specimen: Blood   Result Value Ref Range    Protime 14.5 (H) 11.7 - 14.2 Seconds     INR 1.10 0.90 - 1.10   aPTT    Collection Time: 04/03/24  4:00 PM    Specimen: Blood   Result Value Ref Range    PTT 26.9 22.7 - 35.4 seconds   Magnesium    Collection Time: 04/03/24  4:00 PM    Specimen: Blood   Result Value Ref Range    Magnesium 2.1 1.6 - 2.6 mg/dL   CBC Auto Differential    Collection Time: 04/03/24  4:00 PM    Specimen: Blood   Result Value Ref Range    WBC 9.27 3.40 - 10.80 10*3/mm3    RBC 5.41 4.14 - 5.80 10*6/mm3    Hemoglobin 16.2 13.0 - 17.7 g/dL    Hematocrit 48.8 37.5 - 51.0 %    MCV 90.2 79.0 - 97.0 fL    MCH 29.9 26.6 - 33.0 pg    MCHC 33.2 31.5 - 35.7 g/dL    RDW 11.8 (L) 12.3 - 15.4 %    RDW-SD 38.9 37.0 - 54.0 fl    MPV 12.2 (H) 6.0 - 12.0 fL    Platelets 170 140 - 450 10*3/mm3    Neutrophil % 64.3 42.7 - 76.0 %    Lymphocyte % 19.3 (L) 19.6 - 45.3 %    Monocyte % 11.4 5.0 - 12.0 %    Eosinophil % 3.7 0.3 - 6.2 %    Basophil % 1.0 0.0 - 1.5 %    Immature Grans % 0.3 0.0 - 0.5 %    Neutrophils, Absolute 5.96 1.70 - 7.00 10*3/mm3    Lymphocytes, Absolute 1.79 0.70 - 3.10 10*3/mm3    Monocytes, Absolute 1.06 (H) 0.10 - 0.90 10*3/mm3    Eosinophils, Absolute 0.34 0.00 - 0.40 10*3/mm3    Basophils, Absolute 0.09 0.00 - 0.20 10*3/mm3    Immature Grans, Absolute 0.03 0.00 - 0.05 10*3/mm3    nRBC 0.0 0.0 - 0.2 /100 WBC         RADIOLOGY  CT Head Without Contrast    Result Date: 4/3/2024  CT HEAD WITHOUT CONTRAST  CLINICAL HISTORY: Headache and nausea  TECHNIQUE: CT scan of the head was obtained with 3 mm axial soft tissue algorithm images. No intravenous contrast was administered. Sagittal and coronal reconstructions were obtained.  COMPARISON: No previous similar studies are available for comparison.  FINDINGS:   There is a hypodensity within the posterior and medial aspect of the left cerebellar hemisphere which measures up to approximately 2.9 x 4.9 cm in greatest axial dimensions and is compatible with an acute to subacute infarct within the left PICA distribution. There is mass effect  upon the fourth ventricle which is effaced. However, there is no evidence for obstructive hydrocephalus at this time. There is no evidence for hemorrhagic transformation.  Otherwise, the ventricles, sulci, and cisterns are age-appropriate. The basal ganglia and thalami are unremarkable in appearance.       There are findings most compatible with an acute to subacute infarct within the posterior and medial aspect of the left cerebellar hemisphere within the left PICA distribution. This measures up to 4.9 x 2.9 cm in greatest axial dimensions. There is mass effect upon the fourth ventricle which is effaced although there is no evidence for obstructive hydrocephalus at this time. These findings were discussed with Alissa Nieves on 04/03/2025 at approximately 3:55 p.m.   Radiation dose reduction techniques were utilized, including automated exposure control and exposure modulation based on body size.         MEDICATIONS GIVEN IN ER  Medications   sodium chloride 0.9 % flush 10 mL (has no administration in time range)   ketorolac (TORADOL) injection 30 mg (30 mg Intramuscular Given 4/3/24 1444)   diphenhydrAMINE (BENADRYL) injection 25 mg (25 mg Intravenous Given 4/3/24 1602)   metoclopramide (REGLAN) injection 10 mg (10 mg Intravenous Given 4/3/24 1602)         ORDERS PLACED DURING THIS VISIT:  Orders Placed This Encounter   Procedures    COVID-19, FLU A/B, RSV PCR 1 HR TAT - Swab, Nasopharynx    CT Head Without Contrast    CT Angiogram Head    CT Angiogram Neck    Comprehensive Metabolic Panel    Protime-INR    aPTT    Magnesium    CBC Auto Differential    Inpatient Neurology Consult Stroke    Neurosurgery (on-call MD unless specified)    Pulmonology (on-call MD unless specified)    ECG 12 Lead Stroke Evaluation    Insert Peripheral IV    Inpatient Admission    CBC & Differential         OUTPATIENT MEDICATION MANAGEMENT:  Current Facility-Administered Medications Ordered in Epic   Medication Dose Route Frequency  Provider Last Rate Last Admin    sodium chloride 0.9 % flush 10 mL  10 mL Intravenous PRN Alissa Nieves PA-C         Current Outpatient Medications Ordered in Epic   Medication Sig Dispense Refill    meloxicam (MOBIC) 15 MG tablet Take 1 tablet by mouth Daily. 15 tablet 0    ondansetron (ZOFRAN) 4 MG tablet Take 1 tablet by mouth.           45 minutes of critical care provided. This time excludes other billable procedures. Time does include preparation of documents, medical consultations, review of old records, and direct bedside care. Patient is at high risk for life-threatening deterioration due to acute stroke requiring frequent neurologic assessment, neurology and neurosurgery consultation, and admission to the ICU.         PROGRESS, DATA ANALYSIS, CONSULTS, AND MEDICAL DECISION MAKING  All labs have been independently interpreted by me.  All radiology studies have been reviewed by me. All EKG's have been independently viewed and interpreted by me.  Discussion below represents my analysis of pertinent findings related to patient's condition, differential diagnosis, treatment plan and final disposition.    Differential diagnosis includes but is not limited to intracranial bleed, viral headache, tension headache, sinus infection.    Clinical Scores:            Total (NIH Stroke Scale): 0      ED Course as of 04/03/24 1646   Wed Apr 03, 2024   7719 First Look: Patient presents with symptoms of upper respiratory congestion, diffuse headache that has progressed over the past couple of days.  He has attempted to use ibuprofen without much relief of symptoms.  No reported injury to the head or neck.  Does not take blood thinners.  No numbness or weakness of the face or extremities, slurred speech, visual disturbance, photophobia.  No sick contacts at home.  He does report that his headache is positional and worse when he bends over. [DC]   9787 CT Head Without Contrast  Per my independent interpretation of the CT  head, patient has a left posterior area of encephalomalacia of unclear acuity, no obvious acute intracranial hemorrhage.  Will defer timing and detailed report radiologist. [DC-2]   1555 Preliminary report per Dr. Benton is acute left cerebellar infarct with associated mass effect on the 4th ventricle [MP]   1558 Call placed to stroke neurology [MP]   1606 Dr. Forbes has reviewed CT scan. Recommending admission to ICU. Requesting consult to [facility].  [MP]   1641 I spoke with Tessa with [facility].  She and Dr. Bhatia have reviewed the CT imaging.  They are aware patient is being admitted to ICU and will be available for consult for any change in patient's mental status. [MP]   1642 WBC: 9.27 [DC-2]   1642 Hemoglobin: 16.2 [DC-2]   1642 Glucose(!): 259 [DC-2]   1642 BUN: 12 [DC-2]   1642 Creatinine: 0.88 [DC-2]   1642 Sodium(!): 134 [DC-2]   1642 Potassium: 3.7 [DC-2]   1642 ALT (SGPT): 15 [DC-2]   1642 AST (SGOT): 11 [DC-2]   1642 Alkaline Phosphatase: 66 [DC-2]   1642 Total Bilirubin: 0.7 [DC-2]   1642 RSV, PCR: Not Detected [DC-2]   1642 Influenza B PCR: Not Detected [DC-2]   1642 Influenza A PCR: Not Detected [DC-2]   1642 COVID19: Not Detected [DC-2]   1642 Magnesium: 2.1 [DC-2]   1645 I spoke with Dr. Ahn with pulmonology.  Reviewed patient presentation and ED findings.  He agrees to admit patient to an ICU bed. [MP]      ED Course User Index  [DC] Gene Sandoval PA  [DC-2] Harinder Renee MD  [MP] Alissa Nieves PA-C             AS OF 16:46 EDT VITALS:    BP - 132/89  HR - 93  TEMP - 96.7 °F (35.9 °C)  O2 SATS - 98%    COMPLEXITY OF CARE  The patient requires admission.      DIAGNOSIS  Final diagnoses:   Cerebellar stroke, acute         DISPOSITION  ED Disposition       ED Disposition   Decision to Admit    Condition   --    Comment   Level of Care: Critical Care [6]   Diagnosis: Acute CVA (cerebrovascular accident) [[id number]]   Admitting Physician: AUNG AHN [[id number]]   Attending Physician: AUNG AHN  [[id number]]   Certification: I Certify That Inpatient Hospital Services Are Medically Necessary For Greater Than 2 Midnights                  Please note that portions of this document were completed with a voice recognition program.    Note Disclaimer: At [facility], we believe that sharing information builds trust and better relationships. You are receiving this note because you recently visited [facility]. It is possible you will see health information before a provider has talked with you about it. This kind of information can be easy to misunderstand. To help you fully understand what it means for your health, we urge you to discuss this note with your provider.         Alissa Nieves PA-C  04/03/24 1809

## 2024-04-03 NOTE — ED NOTES
..Nursing report ED to floor  Raghav Looc  43 y.o.  male    HPI :  HPI (Adult)  Stated Reason for Visit: headache, tingling    Chief Complaint  Chief Complaint   Patient presents with    Hypertension    Headache    Eye Problem       Admitting doctor:   Lavelle Mack MD    Admitting diagnosis:   The encounter diagnosis was Cerebellar stroke, acute.    Code status:   Current Code Status       Date Active Code Status Order ID Comments User Context       Not on file            Allergies:   Patient has no known allergies.    Isolation:   Enhanced Droplet/Contact     Intake and Output  No intake or output data in the 24 hours ending 04/03/24 1710    Weight:       04/03/24  1439   Weight: 63.5 kg (140 lb)       Most recent vitals:   Vitals:    04/03/24 1501 04/03/24 1531 04/03/24 1602 04/03/24 1631   BP: 135/89 125/85 123/90 132/89   Pulse: 84 82 80 93   Resp:       Temp:       SpO2: 95% 96% 99% 98%   Weight:       Height:           Active LDAs/IV Access:   Lines, Drains & Airways       Active LDAs       Name Placement date Placement time Site Days    Peripheral IV 04/03/24 1601 Right Antecubital 04/03/24  1601  Antecubital  less than 1                    Labs (abnormal labs have a star):   Labs Reviewed   COMPREHENSIVE METABOLIC PANEL - Abnormal; Notable for the following components:       Result Value    Glucose 259 (*)     Sodium 134 (*)     All other components within normal limits    Narrative:     GFR Normal >60  Chronic Kidney Disease <60  Kidney Failure <15     PROTIME-INR - Abnormal; Notable for the following components:    Protime 14.5 (*)     All other components within normal limits   CBC WITH AUTO DIFFERENTIAL - Abnormal; Notable for the following components:    RDW 11.8 (*)     MPV 12.2 (*)     Lymphocyte % 19.3 (*)     Monocytes, Absolute 1.06 (*)     All other components within normal limits   COVID-19/FLUA&B/RSV, NP SWAB IN TRANSPORT MEDIA 1 HR TAT - Normal    Narrative:     Fact sheet for  providers: https://www.fda.gov/media/907545/download    Fact sheet for patients: https://www.fda.gov/media/153725/download    Test performed by PCR.   APTT - Normal   MAGNESIUM - Normal   CBC AND DIFFERENTIAL    Narrative:     The following orders were created for panel order CBC & Differential.  Procedure                               Abnormality         Status                     ---------                               -----------         ------                     CBC Auto Differential[[id number]]        Abnormal            Final result                 Please view results for these tests on the individual orders.       EKG:   ECG 12 Lead Stroke Evaluation   Preliminary Result   HEART RATE= 74  bpm   RR Interval= 811  ms   MN Interval= 152  ms   P Horizontal Axis= 3  deg   P Front Axis= 61  deg   QRSD Interval= 85  ms   QT Interval= 387  ms   QTcB= 430  ms   QRS Axis= 54  deg   T Wave Axis= 72  deg   - ABNORMAL ECG -   Sinus rhythm   Probable left atrial enlargement   Probable anteroseptal infarct, old   Electronically Signed By:    Date and Time of Study: 2024-04-03 16:54:24          Meds given in ED:   Medications   sodium chloride 0.9 % flush 10 mL (has no administration in time range)   ketorolac (TORADOL) injection 30 mg (30 mg Intramuscular Given 4/3/24 1444)   diphenhydrAMINE (BENADRYL) injection 25 mg (25 mg Intravenous Given 4/3/24 1602)   metoclopramide (REGLAN) injection 10 mg (10 mg Intravenous Given 4/3/24 1602)       Imaging results:  CT Head Without Contrast    Result Date: 4/3/2024   There are findings most compatible with an acute to subacute infarct within the posterior and medial aspect of the left cerebellar hemisphere within the left PICA distribution. This measures up to 4.9 x 2.9 cm in greatest axial dimensions. There is mass effect upon the fourth ventricle which is effaced although there is no evidence for obstructive hydrocephalus at this time. These findings were discussed with Alissa  [facility] on 04/03/2025 at approximately 3:55 p.m.   Radiation dose reduction techniques were utilized, including automated exposure control and exposure modulation based on body size.       Ambulatory status:   - bed rest     Social issues:   Social History     Socioeconomic History    Marital status: Single   Tobacco Use    Smoking status: Every Day    Smokeless tobacco: Never   Substance and Sexual Activity    Alcohol use: Never    Drug use: Never       Peripheral Neurovascular  Peripheral Neurovascular (Adult)  Peripheral Neurovascular WDL: neurovascular assessment upper, neurovascular assessment lower  Pulse Assessment: radial  LUE Neurovascular Assessment  Color LUE: no discoloration  Sensation LUE: no tingling, no numbness, no tenderness  RUE Neurovascular Assessment  Color RUE: no discoloration  Sensation RUE: no numbness, no tenderness, no tingling  LLE Neurovascular Assessment  Color LLE: no discoloration  Sensation LLE: no numbness, no tenderness, no tingling  RLE Neurovascular Assessment  Color RLE: no discoloration  Sensation RLE: no numbness, no tenderness, no tingling    Neuro Cognitive  Neuro Cognitive (Adult)  Cognitive/Neuro/Behavioral WDL: WDL  Orientation: oriented x 4  Speech: clear, logical  Additional Documentation: Dizziness Assessment (Group), Hand /Ankle Strength (Group), Headache Assessment (Group), Pupils (Group)  Headache Assessment  Headache Location: generalized  Severity Rating (0-10): 0  Description/Character: other (see comments) (pt states he has no headache)  Associated Signs/Symptoms: other (see comments)  Pupils  Pupil PERRLA: yes  Hand /Ankle Strength  Hand , Left: strong  Hand , Right: strong  Dorsiflexion, Left: strong  Dorsiflexion, Right: strong  Plantarflexion, Left: strong  Plantarflexion, Right: strong  NIH Stroke Scale  Interval: baseline  1a. Level of Consciousness: 0-->Alert, keenly responsive  1b. LOC Questions: 0-->Answers both questions correctly  1c.  LOC Commands: 0-->Performs both tasks correctly  2. Best Gaze: 0-->Normal  3. Visual: 0-->No visual loss  4. Facial Palsy: 0-->Normal symmetrical movements  5a. Motor Arm, Left: 0-->No drift, limb holds 90 (or 45) degrees for full 10 secs  5b. Motor Arm, Right: 0-->No drift, limb holds 90 (or 45) degrees for full 10 secs  6a. Motor Leg, Left: 0-->No drift, leg holds 30 degree position for full 5 secs  6b. Motor Leg, Right: 0-->No drift, leg holds 30 degree position for full 5 secs  7. Limb Ataxia: 0-->Absent  8. Sensory: 0-->Normal, no sensory loss  9. Best Language: 0-->No aphasia, normal  10. Dysarthria: 0-->Normal  11. Extinction and Inattention (formerly Neglect): 0-->No abnormality  Total (NIH Stroke Scale): 0  Dizziness Assessment  Dizziness Reported Symptoms:  (no dizziness)    Learning  Learning Assessment (Adult)  Learning Readiness and Ability: no barriers identified    Respiratory  Respiratory WDL  Respiratory WDL: WDL, rhythm/pattern, cough  Rhythm/Pattern, Respiratory: depth regular, pattern regular, unlabored  Cough Frequency: infrequent    Abdominal Pain       Pain Assessments  Pain (Adult)  (0-10) Pain Rating: Rest: 6  (0-10) Pain Rating: Activity: 6    NIH Stroke Scale  NIH Stroke Scale  Interval: baseline  1a. Level of Consciousness: 0-->Alert, keenly responsive  1b. LOC Questions: 0-->Answers both questions correctly  1c. LOC Commands: 0-->Performs both tasks correctly  2. Best Gaze: 0-->Normal  3. Visual: 0-->No visual loss  4. Facial Palsy: 0-->Normal symmetrical movements  5a. Motor Arm, Left: 0-->No drift, limb holds 90 (or 45) degrees for full 10 secs  5b. Motor Arm, Right: 0-->No drift, limb holds 90 (or 45) degrees for full 10 secs  6a. Motor Leg, Left: 0-->No drift, leg holds 30 degree position for full 5 secs  6b. Motor Leg, Right: 0-->No drift, leg holds 30 degree position for full 5 secs  7. Limb Ataxia: 0-->Absent  8. Sensory: 0-->Normal, no sensory loss  9. Best Language: 0-->No  aphasia, normal  10. Dysarthria: 0-->Normal  11. Extinction and Inattention (formerly Neglect): 0-->No abnormality  Total (NIH Stroke Scale): 0    Nikki Neves RN  04/03/24 17:10 EDT

## 2024-04-03 NOTE — CONSULTS
Neurology Consult Note    Consult Date: 4/3/2024    Referring MD: Lavelle Mack MD    Reason for Consult I have been asked to see the patient in neurological consultation to render advice and opinion regarding stroke.    Raghav Loco is a 43 y.o. male with PMH of multiple AMI, diabetes, HTN, HLD presents to the ED with acute headache. Patient reports he had sudden dizziness like the room was spinning on Sunday when he was using the restroom. Patient states that he had a little bit of trouble walking back to his bed to lay down for a nap. When he woke up he had a posterior headache. He describes this as a pressure and states that it has been present since. Patient admits to associated N/V with last episode being last night. Patient denies feeling nauseous currently. Patient admits to intermittent numbness and tingling of his left arm since symptoms started. Patient denies weakness, slurred speech or speaking difficulty, blurry vision, double vision, or vision loss. Patient admits to being non-compliant with all medications. Patient smokes about 1 PPD x 29 years. Patient blood pressure on arrival was 134/89, pulse 111, glucose: 259. Patient denies history of heart arrhthymias, palpitations, chest pain currently or SOA.     Past Medical/Surgical Hx:  Past Medical History:   Diagnosis Date    Coronary artery disease     Diabetes mellitus      Past Surgical History:   Procedure Laterality Date    CORONARY ANGIOPLASTY WITH STENT PLACEMENT         Medications On Admission  Medications Prior to Admission   Medication Sig Dispense Refill Last Dose    meloxicam (MOBIC) 15 MG tablet Take 1 tablet by mouth Daily. 15 tablet 0     ondansetron (ZOFRAN) 4 MG tablet Take 1 tablet by mouth.          Allergies:  No Known Allergies    Social Hx:  Social History     Socioeconomic History    Marital status: Single   Tobacco Use    Smoking status: Every Day    Smokeless tobacco: Never   Substance and Sexual Activity    Alcohol  "use: Never    Drug use: Never       Family Hx:  History reviewed. No pertinent family history.        Exam    /89   Pulse 82   Temp 96.7 °F (35.9 °C)   Resp 16   Ht 172.7 cm (68\")   Wt 63.5 kg (140 lb)   SpO2 98%   BMI 21.29 kg/m²   gen: NAD, vitals reviewed  MS: oriented x3, recent/remote memory intact, normal attention/concentration, language intact, no neglect, normal fund of knowledge  CN: visual acuity grossly normal, visual fields full, PERRL, EOMI, facial sensation equal, no facial droop, hearing symmetric, palate elevates symmetrically, shoulder shrug equal, tongue midline  Motor: 5/5 throughout upper and lower extremities, normal tone  Sensation: intact to light touch and temperature throughout  Coordination: mild dysmetria in left upper extremity and left lower extremity    DATA:    Lab Results   Component Value Date    GLUCOSE 259 (H) 04/03/2024    CALCIUM 9.5 04/03/2024     (L) 04/03/2024    K 3.7 04/03/2024    CO2 22.6 04/03/2024     04/03/2024    BUN 12 04/03/2024    CREATININE 0.88 04/03/2024    EGFRIFAFRI >60 02/21/2023    EGFRIFNONA 107 12/31/2019    BCR 13.6 04/03/2024    ANIONGAP 9.4 04/03/2024     Lab Results   Component Value Date    WBC 9.27 04/03/2024    HGB 16.2 04/03/2024    HCT 48.8 04/03/2024    MCV 90.2 04/03/2024     04/03/2024     Lab Results   Component Value Date    LDL 66 01/09/2019    LDL 85 01/08/2019     (H) 11/24/2018     Lab Results   Component Value Date    HGBA1C 10.5 (H) 11/30/2023     Lab Results   Component Value Date    INR 1.10 04/03/2024    INR 1.0 03/15/2022    INR 1.0 10/29/2021    PROTIME 14.5 (H) 04/03/2024    PROTIME 11.6 03/15/2022    PROTIME 12.2 10/29/2021       Lab review: glucose: 259, sodium 134,     Imaging review:   CT head without contrast:  FINDINGS:    There is a hypodensity within the posterior and medial aspect of the  left cerebellar hemisphere which measures up to approximately 2.9 x 4.9  cm in greatest axial " dimensions and is compatible with an acute to  subacute infarct within the left PICA distribution. There is mass effect  upon the fourth ventricle which is effaced. However, there is no  evidence for obstructive hydrocephalus at this time. There is no  evidence for hemorrhagic transformation.  Otherwise, the ventricles, sulci, and cisterns are age-appropriate. The  basal ganglia and thalami are unremarkable in appearance.  IMPRESSION:  There are findings most compatible with an acute to subacute infarct  within the posterior and medial aspect of the left cerebellar hemisphere  within the left PICA distribution. This measures up to 4.9 x 2.9 cm in  greatest axial dimensions. There is mass effect upon the fourth  ventricle which is effaced although there is no evidence for obstructive  hydrocephalus at this time. These findings were discussed with Alissa Nieves on 04/03/2025 at approximately 3:55 p.m.       CTA head and neck:  CTA NECK: The aortic arch has a classic configuration. There is no  significant great vessel origin stenosis. The vertebral arteries are  unremarkable. There is no significant NASCET stenosis within either  internal carotid artery.  CTA HEAD: The vertebral arteries, basilar artery, and posterior cerebral  arteries are unremarkable in appearance. The internal carotids, middle  cerebral arteries, and anterior cerebral arteries are unremarkable.  Again noted are findings most compatible with an acute to subacute  infarct within the posterior medial aspect of the left cerebellar  hemisphere within the left PICA distribution. This infarct measures up  to approximately 4.9 x 2.9 cm in greatest axial dimensions. Again noted  is effacement of the fourth ventricle. There is no evidence for  hydrocephalus at this time. Similar findings were noted on the previous  exam.  IMPRESSION:  Again noted is an acute to subacute infarct involving the posterior and  medial aspect of the left cerebellar hemisphere  within the left PICA  distribution. There is mass effect upon the fourth ventricle which is  stable when compared to the head CT performed earlier today. Again,  there is no evidence for hydrocephalus at this time.    EKG: probable left atrial enlargement, old anteroseptal infarct    Diagnoses:  Acute left cerebellar stroke  HTN  HLD  Diabetes  CAD  Hyponatremia    Comment: Patient with acute left cerebellar infarct measuring 4.9 x 2.9 cm on CT head without contrast. Patient has had dizziness and gait imbalance since Sunday that has been constant. Patient with persistent posterior pressure headache without any nausea currently but did have associated N/V yesterday. Patient with mild LLE and LUE ataxia on examination. Patient should be admitted to ICU for monitoring with STAT head CT ordered with neurological change. Will repeat head CT without contrast in morning. Neurosurgery consulted and states there is nothing acute from a neurological standpoint but agree with ICU observation and maintenance of serum sodium >145. Neurosurgery should be consulted if warranted.     Pre-stroke mRS: 1  NIHSS: 2    PLAN:   -Admit to ICU for close monitoring, q 1 hour checks, any neurological change, STAT CT  -CT head without contrast tomorrow morning  -MRI brain with and without contrast  -Give aspirin now; if cannot swallow give MT  -Maintain permissive HTN for 24-48hrs, do not treat unless BP >180/105 if no contraindication  -Perform bedside swallow test  -Telemetry to monitor for arrhythmia  -VTE prophylaxis  -Neuro checks, if change in exam call neuro oncall  -PT/OT when appropriate   -TTE    Recommendations discussed with Dr. Forbes and he is in agreement with plan.

## 2024-04-04 ENCOUNTER — APPOINTMENT (OUTPATIENT)
Dept: MRI IMAGING | Facility: HOSPITAL | Age: 44
DRG: 064 | End: 2024-04-04
Payer: COMMERCIAL

## 2024-04-04 ENCOUNTER — APPOINTMENT (OUTPATIENT)
Dept: CT IMAGING | Facility: HOSPITAL | Age: 44
DRG: 064 | End: 2024-04-04
Payer: COMMERCIAL

## 2024-04-04 ENCOUNTER — APPOINTMENT (OUTPATIENT)
Dept: CARDIOLOGY | Facility: HOSPITAL | Age: 44
DRG: 064 | End: 2024-04-04
Payer: COMMERCIAL

## 2024-04-04 LAB
ALBUMIN SERPL-MCNC: 4.2 G/DL (ref 3.5–5.2)
ALBUMIN/GLOB SERPL: 1.8 G/DL
ALP SERPL-CCNC: 60 U/L (ref 39–117)
ALT SERPL W P-5'-P-CCNC: 8 U/L (ref 1–41)
ANION GAP SERPL CALCULATED.3IONS-SCNC: 10.9 MMOL/L (ref 5–15)
AORTIC ARCH: 2.6 CM
AORTIC DIMENSIONLESS INDEX: 0.8 (DI)
ASCENDING AORTA: 2.8 CM
AST SERPL-CCNC: 11 U/L (ref 1–40)
BASOPHILS # BLD AUTO: 0.13 10*3/MM3 (ref 0–0.2)
BASOPHILS NFR BLD AUTO: 1.5 % (ref 0–1.5)
BH CV ECHO LEFT VENTRICLE GLOBAL LONGITUDINAL STRAIN: -8.1 %
BH CV ECHO MEAS - ACS: 1.96 CM
BH CV ECHO MEAS - AO MAX PG: 6.6 MMHG
BH CV ECHO MEAS - AO MEAN PG: 3 MMHG
BH CV ECHO MEAS - AO ROOT DIAM: 2.8 CM
BH CV ECHO MEAS - AO V2 MAX: 128 CM/SEC
BH CV ECHO MEAS - AO V2 VTI: 21.8 CM
BH CV ECHO MEAS - AVA(I,D): 2.6 CM2
BH CV ECHO MEAS - EDV(CUBED): 148.9 ML
BH CV ECHO MEAS - EDV(MOD-SP2): 168 ML
BH CV ECHO MEAS - EDV(MOD-SP4): 143 ML
BH CV ECHO MEAS - EF(MOD-BP): 26.6 %
BH CV ECHO MEAS - EF(MOD-SP2): 31 %
BH CV ECHO MEAS - EF(MOD-SP4): 30.1 %
BH CV ECHO MEAS - ESV(CUBED): 55.3 ML
BH CV ECHO MEAS - ESV(MOD-SP2): 116 ML
BH CV ECHO MEAS - ESV(MOD-SP4): 100 ML
BH CV ECHO MEAS - FS: 28.1 %
BH CV ECHO MEAS - IVS/LVPW: 1.13 CM
BH CV ECHO MEAS - IVSD: 0.9 CM
BH CV ECHO MEAS - LAT PEAK E' VEL: 8.2 CM/SEC
BH CV ECHO MEAS - LV MASS(C)D: 162.1 GRAMS
BH CV ECHO MEAS - LV MAX PG: 4.1 MMHG
BH CV ECHO MEAS - LV MEAN PG: 2 MMHG
BH CV ECHO MEAS - LV V1 MAX: 101 CM/SEC
BH CV ECHO MEAS - LV V1 VTI: 17.1 CM
BH CV ECHO MEAS - LVIDD: 5.3 CM
BH CV ECHO MEAS - LVIDS: 3.8 CM
BH CV ECHO MEAS - LVOT AREA: 3.3 CM2
BH CV ECHO MEAS - LVOT DIAM: 2.04 CM
BH CV ECHO MEAS - LVPWD: 0.8 CM
BH CV ECHO MEAS - MED PEAK E' VEL: 6 CM/SEC
BH CV ECHO MEAS - MV A DUR: 0.11 SEC
BH CV ECHO MEAS - MV A MAX VEL: 63.4 CM/SEC
BH CV ECHO MEAS - MV DEC SLOPE: 362.7 CM/SEC2
BH CV ECHO MEAS - MV DEC TIME: 0.2 SEC
BH CV ECHO MEAS - MV E MAX VEL: 53.5 CM/SEC
BH CV ECHO MEAS - MV E/A: 0.84
BH CV ECHO MEAS - MV MAX PG: 1.92 MMHG
BH CV ECHO MEAS - MV MEAN PG: 0.93 MMHG
BH CV ECHO MEAS - MV P1/2T: 53.4 MSEC
BH CV ECHO MEAS - MV V2 VTI: 17.1 CM
BH CV ECHO MEAS - MVA(P1/2T): 4.1 CM2
BH CV ECHO MEAS - MVA(VTI): 3.3 CM2
BH CV ECHO MEAS - PA ACC TIME: 0.12 SEC
BH CV ECHO MEAS - PA V2 MAX: 97 CM/SEC
BH CV ECHO MEAS - PULM A REVS DUR: 0.1 SEC
BH CV ECHO MEAS - PULM A REVS VEL: 27 CM/SEC
BH CV ECHO MEAS - PULM DIAS VEL: 48.8 CM/SEC
BH CV ECHO MEAS - PULM S/D: 0.85
BH CV ECHO MEAS - PULM SYS VEL: 41.4 CM/SEC
BH CV ECHO MEAS - QP/QS: 0.65
BH CV ECHO MEAS - RAP SYSTOLE: 3 MMHG
BH CV ECHO MEAS - RV MAX PG: 2.28 MMHG
BH CV ECHO MEAS - RV V1 MAX: 75.5 CM/SEC
BH CV ECHO MEAS - RV V1 VTI: 13.2 CM
BH CV ECHO MEAS - RVOT DIAM: 1.87 CM
BH CV ECHO MEAS - RVSP: 9 MMHG
BH CV ECHO MEAS - SUP REN AO DIAM: 2 CM
BH CV ECHO MEAS - SV(LVOT): 56.1 ML
BH CV ECHO MEAS - SV(MOD-SP2): 52 ML
BH CV ECHO MEAS - SV(MOD-SP4): 43 ML
BH CV ECHO MEAS - SV(RVOT): 36.3 ML
BH CV ECHO MEAS - TAPSE (>1.6): 1.85 CM
BH CV ECHO MEAS - TR MAX PG: 5.7 MMHG
BH CV ECHO MEAS - TR MAX VEL: 119.2 CM/SEC
BH CV ECHO MEASUREMENTS AVERAGE E/E' RATIO: 7.54
BH CV ECHO SHUNT ASSESSMENT PERFORMED (HIDDEN SCRIPTING): 1
BH CV XLRA - RV BASE: 2.8 CM
BH CV XLRA - RV LENGTH: 9 CM
BH CV XLRA - RV MID: 2.6 CM
BH CV XLRA - TDI S': 11.4 CM/SEC
BILIRUB SERPL-MCNC: 0.3 MG/DL (ref 0–1.2)
BUN SERPL-MCNC: 16 MG/DL (ref 6–20)
BUN/CREAT SERPL: 20.3 (ref 7–25)
CALCIUM SPEC-SCNC: 8.9 MG/DL (ref 8.6–10.5)
CHLORIDE SERPL-SCNC: 104 MMOL/L (ref 98–107)
CHOLEST SERPL-MCNC: 180 MG/DL (ref 0–200)
CO2 SERPL-SCNC: 25.1 MMOL/L (ref 22–29)
CREAT SERPL-MCNC: 0.79 MG/DL (ref 0.76–1.27)
DEPRECATED RDW RBC AUTO: 38.1 FL (ref 37–54)
EGFRCR SERPLBLD CKD-EPI 2021: 111.6 ML/MIN/1.73
EOSINOPHIL # BLD AUTO: 0.54 10*3/MM3 (ref 0–0.4)
EOSINOPHIL NFR BLD AUTO: 6.3 % (ref 0.3–6.2)
ERYTHROCYTE [DISTWIDTH] IN BLOOD BY AUTOMATED COUNT: 11.8 % (ref 12.3–15.4)
GLOBULIN UR ELPH-MCNC: 2.3 GM/DL
GLUCOSE BLDC GLUCOMTR-MCNC: 188 MG/DL (ref 70–130)
GLUCOSE BLDC GLUCOMTR-MCNC: 197 MG/DL (ref 70–130)
GLUCOSE BLDC GLUCOMTR-MCNC: 204 MG/DL (ref 70–130)
GLUCOSE BLDC GLUCOMTR-MCNC: 225 MG/DL (ref 70–130)
GLUCOSE BLDC GLUCOMTR-MCNC: 271 MG/DL (ref 70–130)
GLUCOSE BLDC GLUCOMTR-MCNC: 349 MG/DL (ref 70–130)
GLUCOSE SERPL-MCNC: 236 MG/DL (ref 65–99)
HBA1C MFR BLD: 9.7 % (ref 4.8–5.6)
HCT VFR BLD AUTO: 46.1 % (ref 37.5–51)
HDLC SERPL-MCNC: 30 MG/DL (ref 40–60)
HGB BLD-MCNC: 15.2 G/DL (ref 13–17.7)
IMM GRANULOCYTES # BLD AUTO: 0.02 10*3/MM3 (ref 0–0.05)
IMM GRANULOCYTES NFR BLD AUTO: 0.2 % (ref 0–0.5)
LDLC SERPL CALC-MCNC: 122 MG/DL (ref 0–100)
LDLC/HDLC SERPL: 3.97 {RATIO}
LYMPHOCYTES # BLD AUTO: 2.75 10*3/MM3 (ref 0.7–3.1)
LYMPHOCYTES NFR BLD AUTO: 32.1 % (ref 19.6–45.3)
MCH RBC QN AUTO: 29 PG (ref 26.6–33)
MCHC RBC AUTO-ENTMCNC: 33 G/DL (ref 31.5–35.7)
MCV RBC AUTO: 88 FL (ref 79–97)
MONOCYTES # BLD AUTO: 1.14 10*3/MM3 (ref 0.1–0.9)
MONOCYTES NFR BLD AUTO: 13.3 % (ref 5–12)
NEUTROPHILS NFR BLD AUTO: 4 10*3/MM3 (ref 1.7–7)
NEUTROPHILS NFR BLD AUTO: 46.6 % (ref 42.7–76)
NRBC BLD AUTO-RTO: 0 /100 WBC (ref 0–0.2)
PLATELET # BLD AUTO: 180 10*3/MM3 (ref 140–450)
PMV BLD AUTO: 12.3 FL (ref 6–12)
POTASSIUM SERPL-SCNC: 3.6 MMOL/L (ref 3.5–5.2)
PROT SERPL-MCNC: 6.5 G/DL (ref 6–8.5)
RBC # BLD AUTO: 5.24 10*6/MM3 (ref 4.14–5.8)
SINUS: 3 CM
SODIUM SERPL-SCNC: 140 MMOL/L (ref 136–145)
STJ: 2.8 CM
TRIGL SERPL-MCNC: 154 MG/DL (ref 0–150)
VLDLC SERPL-MCNC: 28 MG/DL (ref 5–40)
WBC NRBC COR # BLD AUTO: 8.58 10*3/MM3 (ref 3.4–10.8)

## 2024-04-04 PROCEDURE — 80053 COMPREHEN METABOLIC PANEL: CPT | Performed by: INTERNAL MEDICINE

## 2024-04-04 PROCEDURE — 70450 CT HEAD/BRAIN W/O DYE: CPT

## 2024-04-04 PROCEDURE — 93356 MYOCRD STRAIN IMG SPCKL TRCK: CPT | Performed by: INTERNAL MEDICINE

## 2024-04-04 PROCEDURE — 82948 REAGENT STRIP/BLOOD GLUCOSE: CPT

## 2024-04-04 PROCEDURE — 25510000001 PERFLUTREN (DEFINITY) 8.476 MG IN SODIUM CHLORIDE (PF) 0.9 % 10 ML INJECTION

## 2024-04-04 PROCEDURE — 70553 MRI BRAIN STEM W/O & W/DYE: CPT

## 2024-04-04 PROCEDURE — 97161 PT EVAL LOW COMPLEX 20 MIN: CPT

## 2024-04-04 PROCEDURE — 93306 TTE W/DOPPLER COMPLETE: CPT | Performed by: INTERNAL MEDICINE

## 2024-04-04 PROCEDURE — 97165 OT EVAL LOW COMPLEX 30 MIN: CPT

## 2024-04-04 PROCEDURE — 93356 MYOCRD STRAIN IMG SPCKL TRCK: CPT

## 2024-04-04 PROCEDURE — 0 GADOBENATE DIMEGLUMINE 529 MG/ML SOLUTION: Performed by: INTERNAL MEDICINE

## 2024-04-04 PROCEDURE — 85025 COMPLETE CBC W/AUTO DIFF WBC: CPT | Performed by: INTERNAL MEDICINE

## 2024-04-04 PROCEDURE — 93306 TTE W/DOPPLER COMPLETE: CPT

## 2024-04-04 PROCEDURE — 80061 LIPID PANEL: CPT

## 2024-04-04 PROCEDURE — A9577 INJ MULTIHANCE: HCPCS | Performed by: INTERNAL MEDICINE

## 2024-04-04 PROCEDURE — 83036 HEMOGLOBIN GLYCOSYLATED A1C: CPT

## 2024-04-04 PROCEDURE — 63710000001 INSULIN LISPRO (HUMAN) PER 5 UNITS: Performed by: INTERNAL MEDICINE

## 2024-04-04 PROCEDURE — 99233 SBSQ HOSP IP/OBS HIGH 50: CPT | Performed by: PSYCHIATRY & NEUROLOGY

## 2024-04-04 RX ORDER — LANCETS 30 GAUGE
EACH MISCELLANEOUS
Qty: 100 EACH | Refills: 0 | OUTPATIENT
Start: 2024-04-04

## 2024-04-04 RX ORDER — DEXTROSE MONOHYDRATE 25 G/50ML
25 INJECTION, SOLUTION INTRAVENOUS
Status: DISCONTINUED | OUTPATIENT
Start: 2024-04-04 | End: 2024-04-07 | Stop reason: HOSPADM

## 2024-04-04 RX ORDER — METOPROLOL SUCCINATE 25 MG/1
25 TABLET, EXTENDED RELEASE ORAL
Status: DISCONTINUED | OUTPATIENT
Start: 2024-04-04 | End: 2024-04-07 | Stop reason: HOSPADM

## 2024-04-04 RX ORDER — ACETAMINOPHEN 325 MG/1
650 TABLET ORAL EVERY 6 HOURS PRN
Status: DISCONTINUED | OUTPATIENT
Start: 2024-04-04 | End: 2024-04-07 | Stop reason: HOSPADM

## 2024-04-04 RX ORDER — INSULIN LISPRO 100 [IU]/ML
2-7 INJECTION, SOLUTION INTRAVENOUS; SUBCUTANEOUS
Status: DISCONTINUED | OUTPATIENT
Start: 2024-04-04 | End: 2024-04-05

## 2024-04-04 RX ORDER — NICOTINE POLACRILEX 4 MG
15 LOZENGE BUCCAL
Status: DISCONTINUED | OUTPATIENT
Start: 2024-04-04 | End: 2024-04-07 | Stop reason: HOSPADM

## 2024-04-04 RX ORDER — LOSARTAN POTASSIUM 25 MG/1
25 TABLET ORAL
Status: DISCONTINUED | OUTPATIENT
Start: 2024-04-04 | End: 2024-04-07 | Stop reason: HOSPADM

## 2024-04-04 RX ORDER — HYDROCODONE BITARTRATE AND ACETAMINOPHEN 5; 325 MG/1; MG/1
1 TABLET ORAL EVERY 6 HOURS PRN
Status: DISCONTINUED | OUTPATIENT
Start: 2024-04-04 | End: 2024-04-07 | Stop reason: HOSPADM

## 2024-04-04 RX ORDER — IBUPROFEN 600 MG/1
1 TABLET ORAL
Status: DISCONTINUED | OUTPATIENT
Start: 2024-04-04 | End: 2024-04-07 | Stop reason: HOSPADM

## 2024-04-04 RX ORDER — INSULIN GLARGINE 100 [IU]/ML
INJECTION, SOLUTION SUBCUTANEOUS NIGHTLY
Qty: 15 ML | Refills: 0 | OUTPATIENT
Start: 2024-04-04

## 2024-04-04 RX ORDER — BLOOD-GLUCOSE METER
KIT MISCELLANEOUS
Qty: 1 EACH | Refills: 0 | OUTPATIENT
Start: 2024-04-04

## 2024-04-04 RX ORDER — BLOOD SUGAR DIAGNOSTIC
STRIP MISCELLANEOUS
Qty: 100 EACH | Refills: 0 | OUTPATIENT
Start: 2024-04-04

## 2024-04-04 RX ORDER — PEN NEEDLE, DIABETIC 30 GX3/16"
1 NEEDLE, DISPOSABLE MISCELLANEOUS 4 TIMES DAILY PRN
Qty: 100 EACH | Refills: 0 | OUTPATIENT
Start: 2024-04-04

## 2024-04-04 RX ORDER — POTASSIUM CHLORIDE 750 MG/1
40 TABLET, FILM COATED, EXTENDED RELEASE ORAL EVERY 4 HOURS
Status: COMPLETED | OUTPATIENT
Start: 2024-04-04 | End: 2024-04-04

## 2024-04-04 RX ADMIN — HYDROCODONE BITARTRATE AND ACETAMINOPHEN 1 TABLET: 5; 325 TABLET ORAL at 11:54

## 2024-04-04 RX ADMIN — Medication 10 ML: at 20:00

## 2024-04-04 RX ADMIN — POTASSIUM CHLORIDE 40 MEQ: 750 TABLET, EXTENDED RELEASE ORAL at 19:50

## 2024-04-04 RX ADMIN — LOSARTAN POTASSIUM 25 MG: 25 TABLET, FILM COATED ORAL at 23:15

## 2024-04-04 RX ADMIN — HYDROCODONE BITARTRATE AND ACETAMINOPHEN 1 TABLET: 5; 325 TABLET ORAL at 17:45

## 2024-04-04 RX ADMIN — POTASSIUM CHLORIDE 40 MEQ: 750 TABLET, EXTENDED RELEASE ORAL at 16:37

## 2024-04-04 RX ADMIN — ASPIRIN 325 MG: 325 TABLET ORAL at 11:54

## 2024-04-04 RX ADMIN — GADOBENATE DIMEGLUMINE 14 ML: 529 INJECTION, SOLUTION INTRAVENOUS at 18:23

## 2024-04-04 RX ADMIN — PERFLUTREN 2 ML: 6.52 INJECTION, SUSPENSION INTRAVENOUS at 13:39

## 2024-04-04 RX ADMIN — INSULIN LISPRO 4 UNITS: 100 INJECTION, SOLUTION INTRAVENOUS; SUBCUTANEOUS at 16:36

## 2024-04-04 RX ADMIN — Medication 10 ML: at 12:56

## 2024-04-04 RX ADMIN — METOPROLOL SUCCINATE 25 MG: 25 TABLET, EXTENDED RELEASE ORAL at 23:15

## 2024-04-04 RX ADMIN — INSULIN LISPRO 3 UNITS: 100 INJECTION, SOLUTION INTRAVENOUS; SUBCUTANEOUS at 11:52

## 2024-04-04 RX ADMIN — INSULIN LISPRO 2 UNITS: 100 INJECTION, SOLUTION INTRAVENOUS; SUBCUTANEOUS at 20:54

## 2024-04-04 RX ADMIN — ATORVASTATIN CALCIUM 80 MG: 80 TABLET, FILM COATED ORAL at 20:00

## 2024-04-04 NOTE — PLAN OF CARE
Goal Outcome Evaluation:  Plan of Care Reviewed With: patient           Outcome Evaluation: Pt passed RN swallow screen. MRI to be completed. NIH 0. CT revealed cerebellar infarct, patient with trouble walking and dizziness. No speech deficits reported. SLP to s/o.

## 2024-04-04 NOTE — CONSULTS
[facility]  Consult Note                                                                              4/5/2024  Lavelle Mack MD    Patient Identification:  Raghav Loco:   43 y.o.  male  1980     Date of Admission:4/3/2024    CC: dizziness     History of Present Illness:  Raghav Loco is a 43 year old male with a history of hypertension, diabetes, tobacco abuse, and CAD with multiple reported MIs.     He was seen at an outside facility in May 2021 for chest pain and nausea and vomiting. He was taken to the cath lab and had repeat stenting of the LAD and first diagonal branch. LV gram on heart catheterization showed EF of 25% which was most likely related to ischemic cardiomyopathy. Discussion of possible ICD placement was had, but he has been lost to follow up since.     He presented to the ER on 4/3/2024 complaining of dizziness with associated posterior headache. He rated his headache 10 out of 10. He decided to be evaluated in the ER when his dizziness and pain worsened. He states he has also felt nauseous, but has not vomited.     In the emergency room his vitals were normal. CT brain revealed acute or subacute left cerebellar stroke with mass effect and compression of the fourth ventricle, but no hydrocephalus. Neurosurgery evaluated patient, but surgical intervention was not necessary.  Cardiology has been asked to see this patient to evaluate for a transesophageal echocardiogram.     Echo today with ejection fraction 30-35% with fixed echodensity in the LV apex consistent with hyper trabeculation or possible thrombus.  My independent review I feel this is more consistent with trabeculation and not thrombus.  Grade 1 diastolic dysfunction, saline injection positive for small PFO     Patient is resting quietly and denies any recent or current symptoms of chest pain or shortness of breath.  He admits he has not been very vigilant about lifestyle modification for known coronary  "disease or cardiomyopathy.  He plans to do better.    Cardiac Catheterization 5/17/2021   Impression:   1. High risk acute coronary syndrome with high risk LEEANN risk score,   postinfarction angina   2. Culprit: Very complex disease with bifurcation stenting restenosis and   thrombosis. Proximal LAD 90% stenosis, de dandre, mid occluded LAD in-stent   100%, in-stent stenosis in diagonal 70%   3. Patent circumflex stents   4. Patent RCA   5. Severe ischemic cardiomyopathy ejection fraction 25%   6. Noncompliance with medical therapy   7. Diabetes   8. Smoker, current active   9. Dyslipidemia   10. Hypertension     Recommendations   1. Long discussion about the need for medical compliance. We discussed CABG   for his anatomy however he was having active anginal symptoms with occluded   and collateralized LAD and needed urgent intervention.   2. Repeat bifurcation stenting was performed as noted above now with double   layer stenting in both LAD and diagonal   3. Will need lifelong dual antiplatelet therapy   4. Aggastat for 2 hours after brilinta   5. Change brilinta to clopidogrel in a.m.   6. I discussed importance of adhering to a dual antiplatelet therapy post   cardiac stenting. We discussed the risk of subacute stent thrombosis,   myocardial infarction, and death if the DAPT is not taken regularly. The   patient expressed good understanding.   7. Smoking cessation   8. Cardiac rehabilitation referral   9. Compliance with medical therapy and follow up   10. Repeat echo 90 days +/- ICD   11. Aggressive secondary prevention, risk factor modification, and medical   therapy. This would include a target HDL goal >40, LDL <70 in addition to a   heart healthy diet, and a goal of 30\" of routine aerobic activity 5-7 days a   week.   12. Empiric beta blocker, statin, and ACEI therapy.         Past Medical History:  Past Medical History:   Diagnosis Date    Coronary artery disease     Diabetes mellitus        Past Surgical " History:  Past Surgical History:   Procedure Laterality Date    CORONARY ANGIOPLASTY WITH STENT PLACEMENT         Allergies:  No Known Allergies    Home Meds:  Medications Prior to Admission   Medication Sig Dispense Refill Last Dose    meloxicam (MOBIC) 15 MG tablet Take 1 tablet by mouth Daily. 15 tablet 0     ondansetron (ZOFRAN) 4 MG tablet Take 1 tablet by mouth.          Current Meds  Scheduled Meds:aspirin, 325 mg, Oral, Daily   Or  aspirin, 300 mg, Rectal, Daily  atorvastatin, 80 mg, Oral, Nightly  insulin lispro, 3-14 Units, Subcutaneous, 4x Daily AC & at Bedtime  losartan, 25 mg, Oral, Q24H  metoprolol succinate XL, 25 mg, Oral, Q24H  sodium chloride, 10 mL, Intravenous, Q12H        Social History:   Social History     Socioeconomic History    Marital status: Single   Tobacco Use    Smoking status: Every Day     Current packs/day: 1.00     Average packs/day: 1 pack/day for 20.0 years (20.0 ttl pk-yrs)     Types: Cigarettes     Start date: 4/4/2004    Smokeless tobacco: Never   Vaping Use    Vaping status: Never Used   Substance and Sexual Activity    Alcohol use: Never    Drug use: Never    Sexual activity: Defer       Family History:  History reviewed. No pertinent family history.    REVIEW OF SYSTEMS:   CONSTITUTIONAL: No weight loss, fever, chills  HEENT: Eyes: No visual loss, blurred vision, double vision or yellow sclerae. Ears, Nose, Throat: No hearing loss, sneezing, congestion, runny nose or sore throat.   SKIN: No rash or itching.     RESPIRATORY: No shortness of breath, hemoptysis, cough or sputum.   GASTROINTESTINAL: No anorexia, nausea, vomiting or diarrhea. No abdominal pain, bright red blood per rectum or melena.  GENITOURINARY: No burning on urination, hematuria or increased frequency.  NEUROLOGICAL: Per HPI  MUSCULOSKELETAL: No muscle, back pain, joint pain or stiffness.   HEMATOLOGIC: No anemia, bleeding or bruising.   LYMPHATICS: No enlarged nodes. No history of splenectomy.  "  PSYCHIATRIC: No history of depression, anxiety, hallucinations.   ENDOCRINOLOGIC: No reports of sweating, cold or heat intolerance. No polyuria or polydipsia.     Physical Exam    BP (!) 87/48   Pulse 65   Temp 97.6 °F (36.4 °C)   Resp 14   Ht 172.7 cm (68\")   Wt 66.8 kg (147 lb 4.3 oz)   SpO2 94%   BMI 22.39 kg/m²     General Appearance Well developed, cooperative and well nourished and no acute distress   Head Normocephalic, without abnormality, atraumatic   Ears Ears appear intact with no abnormalities noted   Throat No oral lesions, no thrush, oral mucosa moist   Neck No adenopathy, supple, trachea midline, no thyromegaly, no carotid bruit, no JVD   Back No skin lesions, erythema or scars, no tenderness to percussion or palpation,range of motion is normal   Lungs Clear to auscultation,respirations regular, even and unlabored   Heart Regular rhythm and normal rate, normal S1 and S2, no murmur, no gallop, no rub, no click   Chest wall No abnormalities observed   Abdomen Normal bowel sounds, no masses, no hepatomegaly,    Extremities Moves all extremities well, no edema, no cyanosis, no redness   Pulses Pulses palpable and equal bilaterally. Normal radial, carotid, femoral, dorsalis pedis and posterior tibial pulses bilaterally. Normal abdominal aorta   Skin No bleeding, bruising or rash   Psychiatric Alert and oriented x 3, normal mood and affect     Results from last 7 days   Lab Units 04/05/24  1153 04/05/24  0619 04/04/24  0332   SODIUM mmol/L 135*  137   < > 140   POTASSIUM mmol/L 4.1   < > 3.6   CHLORIDE mmol/L 103   < > 104   CO2 mmol/L 21.9*   < > 25.1   BUN mg/dL 13   < > 16   CREATININE mg/dL 0.71*   < > 0.79   CALCIUM mg/dL 8.8   < > 8.9   BILIRUBIN mg/dL  --   --  0.3   ALK PHOS U/L  --   --  60   ALT (SGPT) U/L  --   --  8   AST (SGOT) U/L  --   --  11   GLUCOSE mg/dL 189*   < > 236*    < > = values in this interval not displayed.         Results from last 7 days   Lab Units 04/04/24  0332 " 04/03/24  1600   WBC 10*3/mm3 8.58 9.27   HEMOGLOBIN g/dL 15.2 16.2   HEMATOCRIT % 46.1 48.8   PLATELETS 10*3/mm3 180 170     Results from last 7 days   Lab Units 04/03/24  1600   INR  1.10   APTT seconds 26.9     Results from last 7 days   Lab Units 04/03/24  1600   MAGNESIUM mg/dL 2.1     Results from last 7 days   Lab Units 04/05/24  1153   PROBNP pg/mL 111.0                 I personally viewed and interpreted the patient's EKG/Telemetry data  I have reviewed HPI and ROS above.    Assessment and Plan  1.  Acute stroke, felt to likely be embolic with cerebral edema.  I had a long discussion with Dr. Medina regarding this yesterday.  Echo with LV dysfunction and to my independent review of the echo images findings more consistent with trabeculation versus true LV thrombus.  He does also have a PFO with moderate shunting which also can be the etiology/contributor to a embolic event.  Could consider anticoagulation versus dual antiplatelet therapy.  However compliance is concerning especially with anticoagulant therapy.  I discussed this with Dr. Medina and favor dual antiplatelet therapy and 2-week Zio patch in the gentleman who has demonstrated noncompliance in the past and where anticoagulant therapy would pose more risk.  I do not think ANDRA at this point we will add to his care and may have increased risk given his recent stroke with increased risk for complications from sedation..  2.  Severe cardiomyopathy.  Would add low-dose losartan and Toprol as blood pressure allows for now.  Ischemic eval at a later date once he has recovered from his stroke.  3.  Patent foramen ovale with moderate shunt.  As above   4.  Coronary artery disease with prior stent to the LAD with multiple MRI. On asprin now but has not been taking this either as an outpatient..  No anginal symptoms at all.  5.  Hyperlipidemia.  On statin therapy at this time.  6.  Diabetes  7.  Nicotine use    Mini Chavez  4/5/2024  12:57 EDT    60min spent  in reviewing records, discussion and examination of the patient and discussion with other members of the patient's medical team.     Dictated utilizing Dragon dictation

## 2024-04-04 NOTE — PROGRESS NOTES
"DOS: 2024  NAME: Raghav Loco   : 1980  PCP: Provider, No Known  Chief Complaint   Patient presents with    Hypertension    Headache    Eye Problem       Chief complaint: stroke  Subjective: 43-year-old man with history of hypertension, diabetes, tobacco abuse, prior CAD with multiple reported MIs who presented to the hospital last night complaining of headache.  He describes acute onset of ataxia and vertigo on .  Following that he felt his balance improved but his headache worsened.  In the emergency department his CT scan showed a completed left inferior cerebellar stroke with mass effect on the fourth ventricle.  Overnight his headache has persisted and worsened early this morning.    Objective:  Vital signs: /81   Pulse 95   Temp 98.6 °F (37 °C) (Oral)   Resp 14   Ht 172.7 cm (68\")   Wt 68.3 kg (150 lb 9.2 oz)   SpO2 96%   BMI 22.89 kg/m²    Gen: NAD, vitals reviewed  MS: oriented x3, recent/remote memory intact, normal attention/concentration, language intact, no neglect.  CN: visual acuity grossly normal, PERRL, EOMI without nystagmus, no facial droop, no dysarthria  Motor: 5/5 throughout upper and lower extremities, normal tone  Coordination: No dysmetria or overshoot    Laboratory results:  Lab Results   Component Value Date    GLUCOSE 236 (H) 2024    CALCIUM 8.9 2024     2024    K 3.6 2024    CO2 25.1 2024     2024    BUN 16 2024    CREATININE 0.79 2024    EGFRIFAFRI >60 2023    EGFRIFNONA 107 2019    BCR 20.3 2024    ANIONGAP 10.9 2024     Lab Results   Component Value Date    WBC 8.58 2024    HGB 15.2 2024    HCT 46.1 2024    MCV 88.0 2024     2024     Lab Results   Component Value Date     (H) 2024    LDL 66 2019    LDL 85 2019         Lab 24  0332   HEMOGLOBIN A1C 9.70*        Review of labs: CBC, BMP " unremarkable, , hemoglobin A1c 9.7%    Review and interpretation of imaging: I personally reviewed his CT from the emergency department as well as his CTA head and neck and his repeat head CT done this morning.  Initial CT shows a left inferior cerebellar stroke with mass effect on the fourth ventricle without evidence of significant hydrocephalus.  Repeat CT this morning was stable.  CTA does not show any flow-limiting stenosis in the extracranial or intracranial circulation on my review.  Radiology reports reviewed.  Contrasted MRI of the brain ordered.    2D echo pending    Diagnoses:  Stroke, left posterior inferior cerebellar artery, embolic  Coronary artery disease, without angina, native artery  Mixed hyperlipidemia  Non-insulin-dependent diabetes  Tobacco abuse  Brain compression    Comment: Cryptogenic left cerebellar infarct.  CTA negative for large vessel source.  I suspect this is cardioembolic.    Plan:  1.  Aspirin, statin  2.  2D echo.  Cardiology consult for ANDRA tomorrow.  N.p.o. after midnight  3.  Contrasted MRI of the brain  4.  Repeat head CT tomorrow a.m.  5.  Reduce neurochecks to q2H    Management discussed with patient, spouse, nursing staff.    High risk. Cryptogenic cerebellar stroke with mass effect.

## 2024-04-04 NOTE — THERAPY EVALUATION
Patient Name: Raghav Loco  : 1980    MRN: [id number]                              Today's Date: 2024       Admit Date: 4/3/2024    Visit Dx:     ICD-10-CM ICD-9-CM   1. Cerebellar stroke, acute  I63.9 434.91     Patient Active Problem List   Diagnosis    Acute CVA (cerebrovascular accident)     Past Medical History:   Diagnosis Date    Coronary artery disease     Diabetes mellitus      Past Surgical History:   Procedure Laterality Date    CORONARY ANGIOPLASTY WITH STENT PLACEMENT        General Information       Row Name 24 1305          Physical Therapy Time and Intention    Document Type discharge evaluation/summary  -PC     Mode of Treatment co-treatment;physical therapy;occupational therapy  -PC       Row Name 24 1305          General Information    Existing Precautions/Restrictions no known precautions/restrictions  -PC       Row Name 24 1305          Living Environment    People in Home significant other  -PC       Row Name 24 1305          Cognition    Orientation Status (Cognition) oriented x 4  -PC               User Key  (r) = Recorded By, (t) = Taken By, (c) = Cosigned By      Initials Name Provider Type    PC Vonda Gould, PT Physical Therapist                   Mobility       Row Name 24 1305          Bed Mobility    Supine-Sit [place] (Bed Mobility) independent  -PC     Sit-Supine [place] (Bed Mobility) independent  -PC       Row Name 24 1305          Sit-Stand Transfer    Sit-Stand [place] (Transfers) independent  -PC       Row Name 24 1305          Gait/Stairs (Locomotion)    [place] Level (Gait) independent  -PC     Distance in Feet (Gait) 150  -PC     Comment, (Gait/Stairs) normal gait pattern with no loss of balance  -PC               User Key  (r) = Recorded By, (t) = Taken By, (c) = Cosigned By      Initials Name Provider Type    PC Vonda Gould, PT Physical Therapist                   Obj/Interventions        Row Name 04/04/24 1306          Range of Motion Comprehensive    General Range of Motion bilateral lower extremity ROM WNL  -PC       Row Name 04/04/24 1306          Strength Comprehensive (MMT)    Comment, General Manual Muscle Testing (MMT) Assessment B LE WNL  -PC       Row Name 04/04/24 1306          Motor Skills    Motor Skills coordination;functional endurance;motor control/coordination interventions  -PC       Row Name 04/04/24 1306          Balance    Comment, Balance high level gait activities with sudden stop/starts, backward walking, braiding, sidestepping  -PC               User Key  (r) = Recorded By, (t) = Taken By, (c) = Cosigned By      Initials Name Provider Type    PC Vonda Gould G, PT Physical Therapist                   Goals/Plan    No documentation.                  Clinical Impression       Row Name 04/04/24 1308          Pain    Pre/Posttreatment Pain Comment mild HA, asked RN for pain meds  -     Pain Intervention(s) Medication (See MAR)  -       Row Name 04/04/24 1308          Plan of Care Review    Plan of Care Reviewed With patient;significant other  -     Outcome Evaluation Pt is a 44 yo male adm with L cerebellar CVA, pt is doing well, able to ambulate independently with no loss of balance, he lives at home with his significant other, no PT needs identified, pt safe to return home at discharge  -       Row Name 04/04/24 1308          Therapy Assessment/Plan (PT)    Criteria for Skilled Interventions Met (PT) no problems identified which require skilled intervention  -     Therapy Frequency (PT) evaluation only  -       Row Name 04/04/24 1308          Positioning and Restraints    Pre-Treatment Position in bed  -PC     Post Treatment Position bed  -PC     In Bed supine;call light within reach;encouraged to call for assist;notified nsg  -PC               User Key  (r) = Recorded By, (t) = Taken By, (c) = Cosigned By      Initials Name Provider Type    PC Vonda Gould  ROBERT, PT Physical Therapist                   Outcome Measures       Row Name 04/04/24 1310          How much help from another person do you currently need...    Turning from your back to your side while in flat bed without using bedrails? 4  -PC     Moving from lying on back to sitting on the side of a flat bed without bedrails? 4  -PC     Moving to and from a bed to a chair (including a wheelchair)? 4  -PC     Standing up from a chair using your arms (e.g., wheelchair, bedside chair)? 4  -PC     Climbing 3-5 steps with a railing? 4  -PC     To walk in hospital room? 4  -PC     AM-PAC 6 Clicks Score (PT) 24  -PC     Highest Level of Mobility Goal 8 --> Walked 250 feet or more  -PC       Row Name 04/04/24 1255          Modified [place] Scale    Modified [place] Scale 0 - No Symptoms at all.  -SM       Row Name 04/04/24 1255          Functional Assessment    Outcome Measure Options AM-PAC 6 Clicks Daily Activity (OT);Modified [place]  -               User Key  (r) = Recorded By, (t) = Taken By, (c) = Cosigned By      Initials Name Provider Type    PC Vonda Gould, PT Physical Therapist    Patsy Onofre, OT Occupational Therapist                                 Physical Therapy Education       Title: PT OT SLP Therapies (In Progress)       Topic: Physical Therapy (Done)       Point: Mobility training (Done)       Learning Progress Summary             Patient Acceptance, E,D, DU by  at 4/4/2024 1311   Family Acceptance, E,D, DU by  at 4/4/2024 1311                                         User Key       Initials Effective Dates Name Provider Type Discipline     06/16/21 -  Vonda Gould, PT Physical Therapist PT                  PT Recommendation and Plan     Plan of Care Reviewed With: patient, significant other  Outcome Evaluation: Pt is a 42 yo male adm with L cerebellar CVA, pt is doing well, able to ambulate independently with no loss of balance, he lives at home with his significant other, no PT  needs identified, pt safe to return home at discharge     Time Calculation:         PT Charges       Row Name 04/04/24 1311             Time Calculation    Start Time 1050  -PC      Stop Time 1103  -PC      Time Calculation (min) 13 min  -PC      PT Received On 04/04/24  -PC                User Key  (r) = Recorded By, (t) = Taken By, (c) = Cosigned By      Initials Name Provider Type    PC Vonda Gould, PT Physical Therapist                  Therapy Charges for Today       Code Description Service Date Service Provider Modifiers Qty    01852858070 HC PT EVAL LOW COMPLEXITY 2 4/4/2024 Vonda Gould, PT GP 1            PT G-Codes  Outcome Measure Options: AM-PAC 6 Clicks Daily Activity (OT), Modified [place]  AM-PAC 6 Clicks Score (PT): 24  AM-PAC 6 Clicks Score (OT): 23  Modified [place] Scale: 0 - No Symptoms at all.  PT Discharge Summary  Anticipated Discharge Disposition (PT): home with assist, home    Vonda Gould PT  4/4/2024

## 2024-04-04 NOTE — PLAN OF CARE
Goal Outcome Evaluation:      Pt vss, c/o h/a right before scheduled CT. Dr. Forbes notified of neuro change, NIH 0 at beginning and end of shift

## 2024-04-04 NOTE — PROGRESS NOTES
[facility] Acute Inpt Rehab Note     Referral received via stroke order set.  Please note this is a screening only, rehab admissions will not actively be evaluating this patient.  If felt patient is appropriate for our services once therapies begin, please call our office at [phone], to initiate a full referral.    Thank you,   Windy Molina RN   Rehab Admission Nurse

## 2024-04-04 NOTE — CONSULTS
"Diabetes Education  Assessment/Teaching    Patient Name:  Raghav Loco  YOB: 1980  MRN: [id number]  Admit Date:  4/3/2024      Assessment Date:  4/4/2024  Flowsheet Row Most Recent Value   General Information     Referral From: MD Chris order  [A1c 9.7]   Height 172.7 cm (68\")   Weight 68 kg (150 lb)   Weight Method Bed scale   Diabetes History    What type of diabetes do you have? Type 2   Length of Diabetes Diagnosis 1 - 5 years   Current DM knowledge poor   Have you had diabetes education/teaching in the past? no   Have you had high blood sugar? (>140mg/dl) yes   How would you rate your diabetes control? poor   Do you have any diabetes complications? circulation problems   Education Preferences    What areas of diabetes would you like to learn about? diabetes complications   Barriers to Learning other (comment)  [willingness to learn]   Nutrition Information    Assessment Topics    Healthy Eating - Assessment Needs education   Being Active - Assessment Needs education   Taking Medication - Assessment Needs education  [Has not taken any diabetes medication since December 2023]   Problem Solving - Assessment Needs education   Reducing Risk - Assessment Needs education  [A1c 9.7]   Healthy Coping - Assessment Needs education   Monitoring - Assessment Needs education  [Does not check BG at home, has in the past]   DM Goals    Healthy Eating - Goal 0-7 days from discharge   Being Active - Goal 0-7 days from discharge   Taking Medication - Goal 0-7 days from discharge   Problem Solving - Goal 0-7 days from discharge   Reducing Risk - Goal 0-30 days from discharge   Healthy Coping - Goal 0-30 days from discharge   Monitoring - Goal 0-7 days from discharge   Contact Plan Follow-up medical care, 0-30 days from discharge       Flowsheet Row Most Recent Value   DM Education Needs    Meter Meter provided  [ordered]   Medication Insulin, Pen  [ordered, has used in past]   Reducing Risks A1C testing, " "Retinopathy, Cardiovascular/CVA, tobacco cessation   Healthy Coping Other (comment), Denial  [states his \"stubbornness gets in the way\"]   Discharge Plan Home, Follow-up with MD   Motivation Moderate   Teaching Method Explanation, Discussion   Patient Response Verbalized understanding       Other Comments:  Pt admitted for stroke, A1c 9.7. Reports his stubbornness gets in the way of taking care of his diabetes. Has not taken his blood sugar or diabetes medications since December 2023. Does not have PCP, encouraged to follow up with a new one.         Electronically signed by:  Basil Rnedon RN  04/04/24 15:53 EDT  "

## 2024-04-04 NOTE — THERAPY EVALUATION
Patient Name: Raghav Loco  : 1980    MRN: [id number]                              Today's Date: 2024       Admit Date: 4/3/2024    Visit Dx:     ICD-10-CM ICD-9-CM   1. Cerebellar stroke, acute  I63.9 434.91     Patient Active Problem List   Diagnosis    Acute CVA (cerebrovascular accident)     Past Medical History:   Diagnosis Date    Coronary artery disease     Diabetes mellitus      Past Surgical History:   Procedure Laterality Date    CORONARY ANGIOPLASTY WITH STENT PLACEMENT        General Information       Row Name 24 1249          OT Time and Intention    Document Type discharge evaluation/summary  -     Mode of Treatment occupational therapy;co-treatment  -       Row Name 24 1249          General Information    Patient Profile Reviewed yes  -SM     Prior Level of Function independent:;ADL's;community mobility;driving  -     Existing Precautions/Restrictions no known precautions/restrictions  -       Row Name 24 1249          Occupational Profile    Environmental Supports and Barriers (Occupational Profile) no home DME/AD at baseline  -       Row Name 24 1249          Living Environment    People in Home significant other  -       Row Name 24 1249          Cognition    Orientation Status (Cognition) oriented x 4  -SM               User Key  (r) = Recorded By, (t) = Taken By, (c) = Cosigned By      Initials Name Provider Type     Patsy Guy OT Occupational Therapist                     Mobility/ADL's       Row Name 24 1249          Bed Mobility    Bed Mobility supine-sit;sit-supine  -     Supine-Sit [place] (Bed Mobility) independent  -     Sit-Supine [place] (Bed Mobility) independent  -       Row Name 24 1249          Transfers    Transfers sit-stand transfer  -       Row Name 24 1249          Sit-Stand Transfer    Sit-Stand [place] (Transfers) independent  -       Row Name 24 1249           Functional Mobility    Functional Mobility- Ind. Level supervision required  -     Functional Mobility- Comment X1 minor LOB when exiting bathroom but otherwise able to complete high level balance tasks without difficulty.  -[facility] Name 04/04/24 1249          Activities of Daily Living    BADL Assessment/Intervention lower body dressing;toileting;feeding  -SM       Row Name 04/04/24 1249          Lower Body Dressing Assessment/Training    [place] Level (Lower Body Dressing) doff;don;socks;independent;set up  -SM       Row Name 04/04/24 1249          Toileting Assessment/Training    [place] Level (Toileting) supervision  -     Position (Toileting) unsupported standing  -SM       Row Name 04/04/24 1249          Self-Feeding Assessment/Training    Comment, (Feeding) Pt denies difficulty with meals  -               User Key  (r) = Recorded By, (t) = Taken By, (c) = Cosigned By      Initials Name Provider Type    SM Patsy Guy OT Occupational Therapist                   Obj/Interventions       Row Name 04/04/24 1251          Sensory Assessment (Somatosensory)    Sensory Assessment (Somatosensory) UE sensation intact  -SM       Row Name 04/04/24 1251          Vision Assessment/Intervention    Visual Impairment/Limitations WFL  -[facility] Name 04/04/24 1251          Range of Motion Comprehensive    General Range of Motion bilateral upper extremity ROM L  [facility] Name 04/04/24 1251          Strength Comprehensive (MMT)    Comment, General Manual Muscle Testing (MMT) Assessment R shoulder previous RC tear, R shoulder flex 4-/5, L shoulder 4+/5, R elbow flex/ext 4-/5, L elbow flex/ext 4+/5  -[facility] Name 04/04/24 1251          Balance    Balance Assessment sitting static balance;standing static balance;standing dynamic balance  -     Static Sitting Balance independent  -     Static Standing Balance independent  -     Dynamic Standing Balance supervision  -      Position/Device Used, Standing Balance unsupported  -               User Key  (r) = Recorded By, (t) = Taken By, (c) = Cosigned By      Initials Name Provider Type    Patsy Onofre OT Occupational Therapist                   Goals/Plan       Row Name 04/04/24 1254          Problem Specific Goal 1 (OT)    Problem Specific Goal 1 (OT) Pt to complete bADLs and functional mobility for ADLs using safe technique without hands on assist in prep to dc home.  -     Time Frame (Problem Specific Goal 1, OT) short term goal (STG);by discharge  -     Progress/Outcome (Problem Specific Goal 1, OT) goal met  -               User Key  (r) = Recorded By, (t) = Taken By, (c) = Cosigned By      Initials Name Provider Type    Patsy Onofre OT Occupational Therapist                   Clinical Impression       [facility] Name 04/04/24 1252          Pain Assessment    Pre/Posttreatment Pain Comment c/o of HA and requesting pain meds from [facility] [place] [facility]  -     Pain Intervention(s) Nursing Notified  -SM       Row Name 04/04/24 1252          Plan of Care Review    Plan of Care Reviewed With patient;significant other;family  -     Outcome Evaluation Pt is a 43 y.o male admitted to [facility] ICU with acute L cerebellar stroke. Pt is doing very well today. He is able to complete ADLs and mobility/transfers independently to supervision level. Pt with no observed ataxia or balance deficits. Pt denies dizziness with activity. No further OT needs at this time.  -[facility] Name 04/04/24 1252          Therapy Assessment/Plan (OT)    Therapy Frequency (OT) evaluation only  -SM       Row Name 04/04/24 1252          Therapy Plan Review/Discharge Plan (OT)    Anticipated Discharge Disposition (OT) home  -SM       Row Name 04/04/24 1252          Vital Signs    Pre Systolic BP Rehab 104  -SM     Pre Treatment Diastolic BP 61  -SM     Pretreatment Heart Rate (beats/min) 92  -     Pre SpO2 (%) 96  -     O2 Delivery Pre Treatment room air  -        Row Name 04/04/24 1252          Positioning and Restraints    Pre-Treatment Position in bed  -SM     Post Treatment Position bed  -SM     In Bed fowlers;call light within reach;encouraged to call for assist;notified nsg  -               User Key  (r) = Recorded By, (t) = Taken By, (c) = Cosigned By      Initials Name Provider Type    Patsy Onofre OT Occupational Therapist                   Outcome Measures       Row Name 04/04/24 1255          How much help from another is currently needed...    Putting on and taking off regular lower body clothing? 4  -SM     Bathing (including washing, rinsing, and drying) 3  -SM     Toileting (which includes using toilet bed pan or urinal) 4  -SM     Putting on and taking off regular upper body clothing 4  -SM     Taking care of personal grooming (such as brushing teeth) 4  -SM     Eating meals 4  -SM     AM-PAC 6 Clicks Score (OT) 23  -       Row Name 04/04/24 1255          Modified [place] Scale    Modified [place] Scale 0 - No Symptoms at all.  -[facility] Name 04/04/24 1255          Functional Assessment    Outcome Measure Options AM-PAC 6 Clicks Daily Activity (OT);Modified [place]  -               User Key  (r) = Recorded By, (t) = Taken By, (c) = Cosigned By      Initials Name Provider Type    Patsy Onofre OT Occupational Therapist                    Occupational Therapy Education       Title: PT OT SLP Therapies (In Progress)       Topic: Occupational Therapy (In Progress)       Point: ADL training (Done)       Description:   Instruct learner(s) on proper safety adaptation and remediation techniques during self care or transfers.   Instruct in proper use of assistive devices.                  Learning Progress Summary             Patient Acceptance, E, VU by  at 4/4/2024 1256    Comment: caution with high level balance tasks due to area of stroke.                         Point: Home exercise program (Not Started)       Description:   Instruct  learner(s) on appropriate technique for monitoring, assisting and/or progressing therapeutic exercises/activities.                  Learner Progress:  Not documented in this visit.              Point: Precautions (Not Started)       Description:   Instruct learner(s) on prescribed precautions during self-care and functional transfers.                  Learner Progress:  Not documented in this visit.              Point: Body mechanics (Not Started)       Description:   Instruct learner(s) on proper positioning and spine alignment during self-care, functional mobility activities and/or exercises.                  Learner Progress:  Not documented in this visit.                              User Key       Initials Effective Dates Name Provider Type Discipline     04/02/20 -  Patsy Guy OT Occupational Therapist OT                  OT Recommendation and Plan  Therapy Frequency (OT): evaluation only  Plan of Care Review  Plan of Care Reviewed With: patient, significant other, family  Outcome Evaluation: Pt is a 43 y.o male admitted to [facility] ICU with acute L cerebellar stroke. Pt is doing very well today. He is able to complete ADLs and mobility/transfers independently to supervision level. Pt with no observed ataxia or balance deficits. Pt denies dizziness with activity. No further OT needs at this time.     Time Calculation:         Time Calculation- OT       Row Name 04/04/24 1256             Time Calculation- OT    OT Start Time 1046  -      OT Stop Time 1103  -      OT Time Calculation (min) 17 min  -      Total Timed Code Minutes- OT 17 minute(s)  -SM      OT Received On 04/04/24  -         Untimed Charges    OT Eval/Re-eval Minutes 17  -SM         Total Minutes    Untimed Charges Total Minutes 17  -SM       Total Minutes 17  -SM                User Key  (r) = Recorded By, (t) = Taken By, (c) = Cosigned By      Initials Name Provider Type     Patsy Guy OT Occupational Therapist                   Therapy Charges for Today       Code Description Service Date Service Provider Modifiers Qty    03410327134 HC OT EVAL LOW COMPLEXITY 3 4/4/2024 Patsy Guy OT GO 1                 Patsy Guy OT  4/4/2024

## 2024-04-04 NOTE — PROGRESS NOTES
[facility] INPATIENT PROGRESS NOTE         [facility] INTENSIVE CARE    2024      PATIENT IDENTIFICATION:  Name: Raghav Loco ADMIT: 4/3/2024   : 1980  PCP: Provider, No Known    MRN: [id number] LOS: 1 days   AGE/SEX: 43 y.o. male  ROOM: [facility]                     LOS 1    Reason for visit: ICU medical management with acute/subacute left cerebellar stroke      SUBJECTIVE:      Dizzy symptoms resolved.  Denies any weakness, vision changes.  No shortness of breath or cough.  Not requiring any drips.  On room air.  Awaiting further neurology input.  Discussed with family at bedside. I am seeing the patient for the first time today.  All patient problems are new to me.      Objective   OBJECTIVE:    Vital Sign Min/Max for last 24 hours  Temp  Min: 96.7 °F (35.9 °C)  Max: 98.6 °F (37 °C)   BP  Min: 108/67  Max: 150/94   Pulse  Min: 71  Max: 111   Resp  Min: 14  Max: 16   SpO2  Min: 94 %  Max: 100 %   No data recorded   Weight  Min: 63.5 kg (140 lb)  Max: 68.3 kg (150 lb 9.2 oz)    Vitals:    24 0600 24 0700 24 0701 24 0723   BP: 125/86 130/81 130/81    Pulse: 78 100 95    Resp:       Temp:    98.6 °F (37 °C)   TempSrc:    Oral   SpO2: 95% 95% 96%    Weight: 68.3 kg (150 lb 9.2 oz)      Height:                24  1439 24  0600   Weight: 63.5 kg (140 lb) 68.3 kg (150 lb 9.2 oz)       Body mass index is 22.89 kg/m².                          Body mass index is 22.89 kg/m².    Intake/Output Summary (Last 24 hours) at 2024 0941  Last data filed at 2024 0200  Gross per 24 hour   Intake 300 ml   Output 550 ml   Net -250 ml         Exam:  GEN:  No distress, appears stated age  EYES:   PERRL, anicteric sclerae  ENT:    External ears/nose normal, OP clear  NECK:  No adenopathy, midline trachea  LUNGS: Normal chest on inspection, palpation and auscultation  CV:  Normal S1S2, without murmur  ABD:  Nontender, nondistended, no hepatosplenomegaly, +BS  EXT:  No  edema.  No cyanosis or clubbing.  No mottling and normal cap refill.    Assessment     Scheduled meds:  aspirin, 325 mg, Oral, Daily   Or  aspirin, 300 mg, Rectal, Daily  atorvastatin, 80 mg, Oral, Nightly  insulin lispro, 2-7 Units, Subcutaneous, 4x Daily AC & at Bedtime  sodium chloride, 10 mL, Intravenous, Q12H      IV meds:                         Data Review:  Results from last 7 days   Lab Units 04/04/24  0332 04/03/24  1600   SODIUM mmol/L 140 134*   POTASSIUM mmol/L 3.6 3.7   CHLORIDE mmol/L 104 102   CO2 mmol/L 25.1 22.6   BUN mg/dL 16 12   CREATININE mg/dL 0.79 0.88   GLUCOSE mg/dL 236* 259*   CALCIUM mg/dL 8.9 9.5         Estimated Creatinine Clearance: 114.1 mL/min (by C-G formula based on SCr of 0.79 mg/dL).  Results from last 7 days   Lab Units 04/04/24  0332 04/03/24  1600   WBC 10*3/mm3 8.58 9.27   HEMOGLOBIN g/dL 15.2 16.2   PLATELETS 10*3/mm3 180 170     Results from last 7 days   Lab Units 04/03/24  1600   INR  1.10     Results from last 7 days   Lab Units 04/04/24  0332 04/03/24  1600   ALT (SGPT) U/L 8 15   AST (SGOT) U/L 11 11                 Hemoglobin A1C   Date/Time Value Ref Range Status   04/04/2024 0332 9.70 (H) 4.80 - 5.60 % Final     Glucose   Date/Time Value Ref Range Status   04/04/2024 0748 197 (H) 70 - 130 mg/dL Final   04/04/2024 0616 204 (H) 70 - 130 mg/dL Final   04/03/2024 2355 268 (H) 70 - 130 mg/dL Final   04/03/2024 1807 215 (H) 70 - 130 mg/dL Final         Imaging reviewed  Repeat CT head 4/4 reviewed    Microbiology reviewed  RVP negative          Active Hospital Problems    Diagnosis  POA    **Acute CVA (cerebrovascular accident) [I63.9]  Yes      Resolved Hospital Problems   No resolved problems to display.         ASSESSMENT:  Acute/subacute left cerebellar stroke, left PICA distribution, 4.9 x 2.9 cm  Brain compression with mass effect and compression of the fourth ventricle but no hydrocephalus.   Uncontrolled DM type II, A1c 10.5 on 11/30/2023  HTN  Noncompliance with  medical therapy      PLAN:  Neurochecks per protocol.  2D echo and cardiology consulted for ANDRA.  Brain MRI.  Plan repeat CT head tomorrow.  Control blood pressure.  Control glucose.  Aspirin and statin for secondary stroke prevention.  Mechanical DVT prophylaxis.      Naren Connell MD  Pulmonary and Critical Care Medicine  [facility], [facility]  4/4/2024    09:41 EDT

## 2024-04-04 NOTE — PLAN OF CARE
Goal Outcome Evaluation:  Plan of Care Reviewed With: patient, significant other           Outcome Evaluation: Pt is a 42 yo male adm with L cerebellar CVA, pt is doing well, able to ambulate independently with no loss of balance, he lives at home with his significant other, no PT needs identified, pt safe to return home at discharge      Anticipated Discharge Disposition (PT): home with assist, home

## 2024-04-04 NOTE — PLAN OF CARE
Goal Outcome Evaluation:  Plan of Care Reviewed With: patient, significant other, family           Outcome Evaluation: Pt is a 43 y.o male admitted to [facility] ICU with acute L cerebellar stroke. Pt is doing very well today. He is able to complete ADLs and mobility/transfers independently to supervision level. Pt with no observed ataxia or balance deficits. Pt denies dizziness with activity. No further OT needs at this time.      Anticipated Discharge Disposition (OT): home

## 2024-04-04 NOTE — PAYOR COMM NOTE
"Raghav Cruz (43 y.o. Male)                               ATTENTION INITIAL CLINICALS AUTH PENDING [id number]                              REPLY TO [facility] DEPT  [phone] OR CALL   DULCE TAMAYO LPN           Date of Birth   1980    Social Security Number       Address   [address] [place] [id number]    Home Phone   [phone]    MRN   [id number]       Taoist   Yazidi    Marital Status   Single                            Admission Date   4/3/24    Admission Type   Emergency    Admitting Provider   Lavelle Mack MD    Attending Provider   Lavelle Mack MD    Department, Room/Bed   [facility] INTENSIVE CARE, I380/1       Discharge Date       Discharge Disposition       Discharge Destination                                 Attending Provider: Lavelle Mack MD    Allergies: No Known Allergies    Isolation: Enh Drop/Con   Infection: None   Code Status: CPR    Ht: 172.7 cm (68\")   Wt: 68.3 kg (150 lb 9.2 oz)    Admission Cmt: None   Principal Problem: Acute CVA (cerebrovascular accident) [I63.9]                   Active Insurance as of 4/3/2024       Primary Coverage       Payor Plan Insurance Group Employer/Plan Group    [employer] [employer] [place] [employer] [employer] [place]        Payor Plan Address Payor Plan Phone Number Payor Plan Fax Number Effective Dates    [address]   4/1/2019 - None Entered    [facility] [id number]         Subscriber Name Subscriber Birth Date Member ID       RAGHAV CRUZ 1980 [id number]                     Emergency Contacts        (Rel.) Home Phone Work Phone Mobile Phone    DARLINE CRUZ (Brother) -- -- [phone]    Mary Cedeno (Significant Other) -- -- [phone]                 History & Physical        Lavelle Mack MD at 04/03/24 1646                        Patient Care Team:  Provider, No Known as PCP - General    Chief complaint:  Dizziness    History of present " illness:  Subjective    This is a 43 year old male patient, CAD s/p stents, HTN, DM II, non compliance.     He reported feeling dizzy described as the room spinning around him, since Nils 3/31 and had associated  posterior headache since then, reaching 10/10 in intensity today.  It was not elevated by the use of ibuprofen.  No exacerbating factors.  His dizziness improved but returned again today along with worsening headache prompting visit to the ED..  He did have an episode of nausea on Monday but no vomiting.  On presentation to the ED, had normal vitals.  Due to his symptoms a CT of the brain was performed and revealed Acute or subacute left cerebellar stroke with mass effect and compression of the fourth ventricle but no hydrocephalus.  Neurosurgery team was contacted and they did not think intervention would be necessary.    We are asked to admit to the intensive care unit for monitoring.    Apparently patient has not been taking his insulin and BP medications for 2 months.  He stated that he moved to a new house few months ago and has not really had the time to take care of his health issues.  He also smokes 1 pack of cigarettes daily and has been smoking for 30 years.    Review of Systems:  Constitutional: No fever or chills.   ENMT: No sinus congestion  Cardiovascular: No chest pain, palpitation or legs swelling.    Respiratory: No dyspnea, cough or wheezing.  Gastrointestinal: No constipation, diarrhea or abdominal pain.  No current nausea or vomiting.  Neurology: See above.  No visual disturbance.  No weakness or numbness.  Musculoskeletal: No joints pain, stiffness or swelling.   Psychiatry: No depression.  Genitourinary: No dysuria or frequent urination  Endo: No weight changes. No cold or warm intolerance.  Lymphatic: No swollen glands.  Integumentary: No rash.    History  Past Medical History:   Diagnosis Date    Coronary artery disease     Diabetes mellitus      Past Surgical History:   Procedure  Laterality Date    CORONARY ANGIOPLASTY WITH STENT PLACEMENT       History reviewed. No pertinent family history.  Social History     Tobacco Use    Smoking status: Every Day     Current packs/day: 1.00     Types: Cigarettes    Smokeless tobacco: Never   Substance Use Topics    Alcohol use: Never    Drug use: Never     E-cigarette/Vaping     E-cigarette/Vaping Substances     Medications Prior to Admission   Medication Sig Dispense Refill Last Dose    meloxicam (MOBIC) 15 MG tablet Take 1 tablet by mouth Daily. 15 tablet 0     ondansetron (ZOFRAN) 4 MG tablet Take 1 tablet by mouth.        Allergies:  Patient has no known allergies.    Objective  Vital Signs  Temp:  [96.7 °F (35.9 °C)] 96.7 °F (35.9 °C)  Heart Rate:  [] 93  Resp:  [16] 16  BP: (123-135)/(85-90) 132/89    PPE used per hospital policy    Physical Exam:  Constitutional: Not in acute distress.  Eyes: Injected conjunctivae, EOMI. Pupils equal and reactive to light.   ENMT: Knight 3. No oral thrush.  Moist tongue.  Neck: No thyromegaly.  Trachea midline  Heart: RRR, no murmur.  No pitting edema  Lungs: Good and equal air entry bilaterally.  Nonlabored breathing  Abdomen: Obese. Soft. No tenderness or dullness.  Positive bowel sounds  Extremities: No cyanosis, clubbing. Moves all extremities.  Warm extremities and well-perfused  Neuro: Conscious, alert, oriented x3.  Strength 5/5 in arms and legs.  Visual field intact.  Finger-to-nose test normal.  Psych: Appropriate mood and affect.    Integumentary: No rash or ecchymosis  Lymphatic: No palpable cervical or supraclavicular lymph nodes.      Diagnostic imaging:  I personally and independently reviewed the following images:   CT brain 4/3/24: Left cerebellar stroke with brain compression.  Findings summarized below in assessment.      Laboratory workup:  Results from last 7 days   Lab Units 04/03/24  1600   SODIUM mmol/L 134*   POTASSIUM mmol/L 3.7   CHLORIDE mmol/L 102   CO2 mmol/L 22.6   BUN mg/dL  12   CREATININE mg/dL 0.88   GLUCOSE mg/dL 259*   CALCIUM mg/dL 9.5         Results from last 7 days   Lab Units 04/03/24  1600   WBC 10*3/mm3 9.27   HEMOGLOBIN g/dL 16.2   HEMATOCRIT % 48.8   PLATELETS 10*3/mm3 170     Results from last 7 days   Lab Units 04/03/24  1600   INR  1.10   APTT seconds 26.9           Assessment    Acute/subacute left cerebellar stroke, left PICA distribution, 4.9 x 2.9 cm  Brain compression with mass effect and compression of the fourth ventricle but no hydrocephalus.    Uncontrolled DM type II, A1c 10.5 on 11/30/2023  HTN  Noncompliance with medical therapy        Plan:  Admit to ICU  Neurocheck per protocol  Check CTA head and neck.  CT brain in a.m. and as needed if change in neurostatus.  Watch for signs of increased intracranial pressure such as nausea/vomiting, regular aspiration and bradycardia.  Check A1c.  And lipid profile.  Initiate insulin subcu as needed for hyperglycemia.  PT OT.        Lavelle Mack MD  04/03/24  16:44 EDT    Time: Critical care 35 min      This note was dictated utilizing Dragon dictation     Electronically signed by Lavelle Mack MD at 04/03/24 2120          Emergency Department Notes        Nikki Neves RN at 04/03/24 1710          ..Nursing report ED to floor  Raghav Loco  43 y.o.  male    HPI :  HPI (Adult)  Stated Reason for Visit: headache, tingling    Chief Complaint  Chief Complaint   Patient presents with    Hypertension    Headache    Eye Problem       Admitting doctor:   Lavelle Mack MD    Admitting diagnosis:   The encounter diagnosis was Cerebellar stroke, acute.    Code status:   Current Code Status       Date Active Code Status Order ID Comments User Context       Not on file            Allergies:   Patient has no known allergies.    Isolation:   Enhanced Droplet/Contact     Intake and Output  No intake or output data in the 24 hours ending 04/03/24 1710    Weight:       04/03/24  1439   Weight: 63.5 kg (140 lb)       Most  recent vitals:   Vitals:    04/03/24 1501 04/03/24 1531 04/03/24 1602 04/03/24 1631   BP: 135/89 125/85 123/90 132/89   Pulse: 84 82 80 93   Resp:       Temp:       SpO2: 95% 96% 99% 98%   Weight:       Height:           Active LDAs/IV Access:   Lines, Drains & Airways       Active LDAs       Name Placement date Placement time Site Days    Peripheral IV 04/03/24 1601 Right Antecubital 04/03/24  1601  Antecubital  less than 1                    Labs (abnormal labs have a star):   Labs Reviewed   COMPREHENSIVE METABOLIC PANEL - Abnormal; Notable for the following components:       Result Value    Glucose 259 (*)     Sodium 134 (*)     All other components within normal limits    Narrative:     GFR Normal >60  Chronic Kidney Disease <60  Kidney Failure <15     PROTIME-INR - Abnormal; Notable for the following components:    Protime 14.5 (*)     All other components within normal limits   CBC WITH AUTO DIFFERENTIAL - Abnormal; Notable for the following components:    RDW 11.8 (*)     MPV 12.2 (*)     Lymphocyte % 19.3 (*)     Monocytes, Absolute 1.06 (*)     All other components within normal limits   COVID-19/FLUA&B/RSV, NP SWAB IN TRANSPORT MEDIA 1 HR TAT - Normal    Narrative:     Fact sheet for providers: https://www.fda.gov/media/757730/download    Fact sheet for patients: https://www.fda.gov/media/635129/download    Test performed by PCR.   APTT - Normal   MAGNESIUM - Normal   CBC AND DIFFERENTIAL    Narrative:     The following orders were created for panel order CBC & Differential.  Procedure                               Abnormality         Status                     ---------                               -----------         ------                     CBC Auto Differential[[id number]]        Abnormal            Final result                 Please view results for these tests on the individual orders.       EKG:   ECG 12 Lead Stroke Evaluation   Preliminary Result   HEART RATE= 74  bpm   RR Interval= 811  ms    [place] Interval= 152  ms   P Horizontal Axis= 3  deg   P Front Axis= 61  deg   QRSD Interval= 85  ms   QT Interval= 387  ms   QTcB= 430  ms   QRS Axis= 54  deg   T Wave Axis= 72  deg   - ABNORMAL ECG -   Sinus rhythm   Probable left atrial enlargement   Probable anteroseptal infarct, old   Electronically Signed By:    Date and Time of Study: 2024-04-03 16:54:24          Meds given in ED:   Medications   sodium chloride 0.9 % flush 10 mL (has no administration in time range)   ketorolac (TORADOL) injection 30 mg (30 mg Intramuscular Given 4/3/24 1444)   diphenhydrAMINE (BENADRYL) injection 25 mg (25 mg Intravenous Given 4/3/24 1602)   metoclopramide (REGLAN) injection 10 mg (10 mg Intravenous Given 4/3/24 1602)       Imaging results:  CT Head Without Contrast    Result Date: 4/3/2024   There are findings most compatible with an acute to subacute infarct within the posterior and medial aspect of the left cerebellar hemisphere within the left PICA distribution. This measures up to 4.9 x 2.9 cm in greatest axial dimensions. There is mass effect upon the fourth ventricle which is effaced although there is no evidence for obstructive hydrocephalus at this time. These findings were discussed with Alissa Nieves on 04/03/2025 at approximately 3:55 p.m.   Radiation dose reduction techniques were utilized, including automated exposure control and exposure modulation based on body size.       Ambulatory status:   - bed rest     Social issues:   Social History     Socioeconomic History    Marital status: Single   Tobacco Use    Smoking status: Every Day    Smokeless tobacco: Never   Substance and Sexual Activity    Alcohol use: Never    Drug use: Never       Peripheral Neurovascular  Peripheral Neurovascular (Adult)  Peripheral Neurovascular WDL: neurovascular assessment upper, neurovascular assessment lower  Pulse Assessment: radial  LUE Neurovascular Assessment  Color LUE: no discoloration  Sensation LUE: no tingling, no  numbness, no tenderness  RUE Neurovascular Assessment  Color RUE: no discoloration  Sensation RUE: no numbness, no tenderness, no tingling  LLE Neurovascular Assessment  Color LLE: no discoloration  Sensation LLE: no numbness, no tenderness, no tingling  RLE Neurovascular Assessment  Color RLE: no discoloration  Sensation RLE: no numbness, no tenderness, no tingling    Neuro Cognitive  Neuro Cognitive (Adult)  Cognitive/Neuro/Behavioral WDL: WDL  Orientation: oriented x 4  Speech: clear, logical  Additional Documentation: Dizziness Assessment (Group), Hand /Ankle Strength (Group), Headache Assessment (Group), Pupils (Group)  Headache Assessment  Headache Location: generalized  Severity Rating (0-10): 0  Description/Character: other (see comments) (pt states he has no headache)  Associated Signs/Symptoms: other (see comments)  Pupils  Pupil PERRLA: yes  Hand /Ankle Strength  Hand , Left: strong  Hand , Right: strong  Dorsiflexion, Left: strong  Dorsiflexion, Right: strong  Plantarflexion, Left: strong  Plantarflexion, Right: strong  NIH Stroke Scale  Interval: baseline  1a. Level of Consciousness: 0-->Alert, keenly responsive  1b. LOC Questions: 0-->Answers both questions correctly  1c. LOC Commands: 0-->Performs both tasks correctly  2. Best Gaze: 0-->Normal  3. Visual: 0-->No visual loss  4. Facial Palsy: 0-->Normal symmetrical movements  5a. Motor Arm, Left: 0-->No drift, limb holds 90 (or 45) degrees for full 10 secs  5b. Motor Arm, Right: 0-->No drift, limb holds 90 (or 45) degrees for full 10 secs  6a. Motor Leg, Left: 0-->No drift, leg holds 30 degree position for full 5 secs  6b. Motor Leg, Right: 0-->No drift, leg holds 30 degree position for full 5 secs  7. Limb Ataxia: 0-->Absent  8. Sensory: 0-->Normal, no sensory loss  9. Best Language: 0-->No aphasia, normal  10. Dysarthria: 0-->Normal  11. Extinction and Inattention (formerly Neglect): 0-->No abnormality  Total (NIH Stroke Scale):  0  Dizziness Assessment  Dizziness Reported Symptoms:  (no dizziness)    Learning  Learning Assessment (Adult)  Learning Readiness and Ability: no barriers identified    Respiratory  Respiratory WDL  Respiratory WDL: WDL, rhythm/pattern, cough  Rhythm/Pattern, Respiratory: depth regular, pattern regular, unlabored  Cough Frequency: infrequent    Abdominal Pain       Pain Assessments  Pain (Adult)  (0-10) Pain Rating: Rest: 6  (0-10) Pain Rating: Activity: 6    NIH Stroke Scale  NIH Stroke Scale  Interval: baseline  1a. Level of Consciousness: 0-->Alert, keenly responsive  1b. LOC Questions: 0-->Answers both questions correctly  1c. LOC Commands: 0-->Performs both tasks correctly  2. Best Gaze: 0-->Normal  3. Visual: 0-->No visual loss  4. Facial Palsy: 0-->Normal symmetrical movements  5a. Motor Arm, Left: 0-->No drift, limb holds 90 (or 45) degrees for full 10 secs  5b. Motor Arm, Right: 0-->No drift, limb holds 90 (or 45) degrees for full 10 secs  6a. Motor Leg, Left: 0-->No drift, leg holds 30 degree position for full 5 secs  6b. Motor Leg, Right: 0-->No drift, leg holds 30 degree position for full 5 secs  7. Limb Ataxia: 0-->Absent  8. Sensory: 0-->Normal, no sensory loss  9. Best Language: 0-->No aphasia, normal  10. Dysarthria: 0-->Normal  11. Extinction and Inattention (formerly Neglect): 0-->No abnormality  Total (NIH Stroke Scale): 0    Nikki Neves RN  04/03/24 17:10 EDT      Electronically signed by Nikki Neves RN at 04/03/24 1710       Harinder Renee MD at 04/03/24 1637          Pt presents to the ED c/o acute onset of dizziness on Sunday night with posterior headache, has had some nasal congestion, has had some nausea and vomiting last on Monday.  No focal isolated numbness or weakness of the arms or legs.  Denies any balance issues.     On exam,   General: No acute distress, nontoxic  HEENT: Mucous membranes moist, atraumatic, EOMI  Neck: Full ROM  Pulm: Symmetric chest rise, nonlabored, lungs  CTAB  Cardiovascular: Regular rate and rhythm, intact distal pulses  GI: Soft, nontender, nondistended, no rebound, no guarding, bowel sounds present  MSK: Full ROM, no deformity  Skin: Warm, dry  Neuro: Awake, alert, oriented x 4, GCS 15, no facial droop or dysarthria or aphasia, moving all extremities, no focal deficits  Psych: Calm, cooperative      EKG - Per my independent interpretation at 1700:    EKG Time: 1654  Rhythm/Rate: Sinus rhythm with a rate of 74  Normal axis  Normal intervals  No acute ischemic changes  No STEMI       No emergent changes compared to December 31, 2019      Plan: Initial concern for viral process, intracranial hemorrhage, TIA or CVA, dehydration, renal failure, electrolyte normalities, among others.  Plan for labs, CT head, and reevaluation with results.    ED Course as of 04/03/24 1803   Wed Apr 03, 2024   1429 First Look: Patient presents with symptoms of upper respiratory congestion, diffuse headache that has progressed over the past couple of days.  He has attempted to use ibuprofen without much relief of symptoms.  No reported injury to the head or neck.  Does not take blood thinners.  No numbness or weakness of the face or extremities, slurred speech, visual disturbance, photophobia.  No sick contacts at home.  He does report that his headache is positional and worse when he bends over. [DC]   1545 CT Head Without Contrast  Per my independent interpretation of the CT head, patient has a left posterior area of encephalomalacia of unclear acuity, no obvious acute intracranial hemorrhage.  Will defer timing and detailed report radiologist. [DC-2]   1555 Preliminary report per Dr. Benton is acute left cerebellar infarct with associated mass effect on the 4th ventricle [MP]   1558 Call placed to stroke neurology [MP]   1606 Dr. Forbes has reviewed CT scan. Recommending admission to ICU. Requesting consult to [facility].  [MP]   1641 I spoke with Tessa with [facility].  She and Dr. Bhatia have  reviewed the CT imaging.  They are aware patient is being admitted to ICU and will be available for consult for any change in patient's mental status. [MP]   1642 WBC: 9.27 [DC-2]   1642 Hemoglobin: 16.2 [DC-2]   1642 Glucose(!): 259 [DC-2]   1642 BUN: 12 [DC-2]   1642 Creatinine: 0.88 [DC-2]   1642 Sodium(!): 134 [DC-2]   1642 Potassium: 3.7 [DC-2]   1642 ALT (SGPT): 15 [DC-2]   1642 AST (SGOT): 11 [DC-2]   1642 Alkaline Phosphatase: 66 [DC-2]   1642 Total Bilirubin: 0.7 [DC-2]   1642 RSV, PCR: Not Detected [DC-2]   1642 Influenza B PCR: Not Detected [DC-2]   1642 Influenza A PCR: Not Detected [DC-2]   1642 COVID19: Not Detected [DC-2]   1642 Magnesium: 2.1 [DC-2]   1645 I spoke with Dr. Mack with pulmonology.  Reviewed patient presentation and ED findings.  He agrees to admit patient to an ICU bed. [MP]      ED Course User Index  [DC] Gene Sandoval PA  [DC-2] Harinder Renee MD  [MP] Alissa Nieves PA-C       CT scan today shows an acute cerebellar stroke with edema and mass effect on the fourth ventricle.  Stroke neurology has been consulted, neurosurgery has been consulted per stroke neurology recommendations.  Patient is overall in no acute distress but understands need for intensive care unit stay per neurology recommendations.     MD Attestation Note    SHARED VISIT: This visit was performed by BOTH a physician and an APC. The substantive portion of the medical decision making was performed by this attesting physician who made or approved the management plan and takes responsibility for patient management. All studies in the APC note (if performed) were independently interpreted by me.                   Harinder Renee MD  04/03/24 1804      Electronically signed by Harinder Renee MD at 04/03/24 1804       Alissa Nieves PA-C at 04/03/24 1503       Attestation signed by Harinder Renee MD at 04/03/24 1808        SHARED APC FACE TO FACE: I performed a substantive part of the MDM during the  patient's E/M visit. I personally evaluated and examined the patient. I personally made or approved the documented management plan and acknowledge its risk of complications.   Harinder Renee MD 4/3/2024 18:07 EDT                          EMERGENCY DEPARTMENT ENCOUNTER  Room Number:  04/04  PCP: Provider, No Known  Independent Historians: Patient      HPI:  Chief Complaint: had concerns including Hypertension, Headache, and Eye Problem.     A complete HPI/ROS/PMH/PSH/SH/FH are unobtainable due to: None    Chronic or social conditions impacting patient care (Social Determinants of Health): None      Context: Raghav Loco is a 43 y.o. male with a medical history of diabetes, hypertension, hyperlipidemia, esophagitis, CAD who presents to the ED c/o acute headache.  Patient reports he had a lightheaded spell on Sunday and laid down for nap.  When he woke up he had posterior headache.  He describes this as a pressure.  Headache has been constant since onset.  Reports associated nasal congestion.  No known sick contacts.  No fall or head injury.  Patient is not anticoagulated.  He admits that he has been noncompliant and is not taking any of his medications.  Patient denies fever, syncope, chest pain, dyspnea, vision loss, unilateral numbness/weakness, or any other systemic complaint.      Review of prior external notes (non-ED) -and- Review of prior external test results outside of this encounter:  Patient seen in office by PCP on 1/17/2023 for diabetes, hypertension, hyperlipidemia.  Reviewed assessment and plan.  Will check labs, refill medications, referrals placed to GI, cardiology, orthopedics, patient will follow-up in 4 weeks.  Reviewed labs collected on 12/2/2023.  CBC with hemoglobin 13.0, BMP with creatinine 0.44.    Prescription drug monitoring program review:     N/A    PAST MEDICAL HISTORY  Active Ambulatory Problems     Diagnosis Date Noted    No Active Ambulatory Problems     Resolved Ambulatory  Problems     Diagnosis Date Noted    No Resolved Ambulatory Problems     Past Medical History:   Diagnosis Date    Coronary artery disease     Diabetes mellitus          PAST SURGICAL HISTORY  Past Surgical History:   Procedure Laterality Date    CORONARY ANGIOPLASTY WITH STENT PLACEMENT           FAMILY HISTORY  History reviewed. No pertinent family history.      SOCIAL HISTORY  Social History     Socioeconomic History    Marital status: Single   Tobacco Use    Smoking status: Every Day    Smokeless tobacco: Never   Substance and Sexual Activity    Alcohol use: Never    Drug use: Never         ALLERGIES  Patient has no known allergies.      REVIEW OF SYSTEMS  Review of Systems   Constitutional:  Negative for chills and fever.   HENT:  Positive for congestion. Negative for ear pain and sore throat.    Respiratory:  Negative for cough and shortness of breath.    Cardiovascular:  Negative for chest pain and palpitations.   Gastrointestinal:  Negative for abdominal pain and vomiting.   Genitourinary:  Negative for dysuria and hematuria.   Musculoskeletal:  Negative for arthralgias and joint swelling.   Skin:  Negative for pallor and rash.   Neurological:  Positive for headaches. Negative for syncope.   Psychiatric/Behavioral:  Negative for confusion and hallucinations.      Included in HPI  All systems reviewed and negative except for those discussed in HPI.      PHYSICAL EXAM    I have reviewed the triage vital signs and nursing notes.    ED Triage Vitals   Temp Heart Rate Resp BP SpO2   04/03/24 1328 04/03/24 1328 04/03/24 1328 04/03/24 1439 04/03/24 1328   96.7 °F (35.9 °C) 111 16 134/89 97 %      Temp src Heart Rate Source Patient Position BP Location FiO2 (%)   -- -- -- -- --              Physical Exam  Constitutional:       General: He is not in acute distress.     Appearance: Normal appearance.   HENT:      Head: Normocephalic and atraumatic.      Nose: Nose normal.      Mouth/Throat:      Mouth: Mucous  membranes are moist.   Eyes:      Extraocular Movements: Extraocular movements intact.      Conjunctiva/sclera: Conjunctivae normal.      Pupils: Pupils are equal, round, and reactive to light.   Cardiovascular:      Rate and Rhythm: Normal rate and regular rhythm.      Pulses: Normal pulses.      Heart sounds: Normal heart sounds.   Pulmonary:      Effort: Pulmonary effort is normal.      Breath sounds: Normal breath sounds.   Abdominal:      General: There is no distension.   Musculoskeletal:         General: Normal range of motion.      Cervical back: Normal range of motion and neck supple.   Skin:     General: Skin is warm.      Capillary Refill: Capillary refill takes less than 2 seconds.   Neurological:      General: No focal deficit present.      Mental Status: He is alert and oriented to person, place, and time.      Comments: Motor strength 5/5 all 4 extremities   Psychiatric:         Mood and Affect: Mood normal.           LAB RESULTS  Recent Results (from the past 24 hour(s))   COVID-19, FLU A/B, RSV PCR 1 HR TAT - Swab, Nasopharynx    Collection Time: 04/03/24  2:38 PM    Specimen: Nasopharynx; Swab   Result Value Ref Range    COVID19 Not Detected Not Detected - Ref. Range    Influenza A PCR Not Detected Not Detected    Influenza B PCR Not Detected Not Detected    RSV, PCR Not Detected Not Detected   Comprehensive Metabolic Panel    Collection Time: 04/03/24  4:00 PM    Specimen: Blood   Result Value Ref Range    Glucose 259 (H) 65 - 99 mg/dL    BUN 12 6 - 20 mg/dL    Creatinine 0.88 0.76 - 1.27 mg/dL    Sodium 134 (L) 136 - 145 mmol/L    Potassium 3.7 3.5 - 5.2 mmol/L    Chloride 102 98 - 107 mmol/L    CO2 22.6 22.0 - 29.0 mmol/L    Calcium 9.5 8.6 - 10.5 mg/dL    Total Protein 7.5 6.0 - 8.5 g/dL    Albumin 4.7 3.5 - 5.2 g/dL    ALT (SGPT) 15 1 - 41 U/L    AST (SGOT) 11 1 - 40 U/L    Alkaline Phosphatase 66 39 - 117 U/L    Total Bilirubin 0.7 0.0 - 1.2 mg/dL    Globulin 2.8 gm/dL    A/G Ratio 1.7 g/dL     BUN/Creatinine Ratio 13.6 7.0 - 25.0    Anion Gap 9.4 5.0 - 15.0 mmol/L    eGFR 108.1 >60.0 mL/min/1.73   Protime-INR    Collection Time: 04/03/24  4:00 PM    Specimen: Blood   Result Value Ref Range    Protime 14.5 (H) 11.7 - 14.2 Seconds    INR 1.10 0.90 - 1.10   aPTT    Collection Time: 04/03/24  4:00 PM    Specimen: Blood   Result Value Ref Range    PTT 26.9 22.7 - 35.4 seconds   Magnesium    Collection Time: 04/03/24  4:00 PM    Specimen: Blood   Result Value Ref Range    Magnesium 2.1 1.6 - 2.6 mg/dL   CBC Auto Differential    Collection Time: 04/03/24  4:00 PM    Specimen: Blood   Result Value Ref Range    WBC 9.27 3.40 - 10.80 10*3/mm3    RBC 5.41 4.14 - 5.80 10*6/mm3    Hemoglobin 16.2 13.0 - 17.7 g/dL    Hematocrit 48.8 37.5 - 51.0 %    MCV 90.2 79.0 - 97.0 fL    MCH 29.9 26.6 - 33.0 pg    MCHC 33.2 31.5 - 35.7 g/dL    RDW 11.8 (L) 12.3 - 15.4 %    RDW-SD 38.9 37.0 - 54.0 fl    MPV 12.2 (H) 6.0 - 12.0 fL    Platelets 170 140 - 450 10*3/mm3    Neutrophil % 64.3 42.7 - 76.0 %    Lymphocyte % 19.3 (L) 19.6 - 45.3 %    Monocyte % 11.4 5.0 - 12.0 %    Eosinophil % 3.7 0.3 - 6.2 %    Basophil % 1.0 0.0 - 1.5 %    Immature Grans % 0.3 0.0 - 0.5 %    Neutrophils, Absolute 5.96 1.70 - 7.00 10*3/mm3    Lymphocytes, Absolute 1.79 0.70 - 3.10 10*3/mm3    Monocytes, Absolute 1.06 (H) 0.10 - 0.90 10*3/mm3    Eosinophils, Absolute 0.34 0.00 - 0.40 10*3/mm3    Basophils, Absolute 0.09 0.00 - 0.20 10*3/mm3    Immature Grans, Absolute 0.03 0.00 - 0.05 10*3/mm3    nRBC 0.0 0.0 - 0.2 /100 WBC         RADIOLOGY  CT Head Without Contrast    Result Date: 4/3/2024  CT HEAD WITHOUT CONTRAST  CLINICAL HISTORY: Headache and nausea  TECHNIQUE: CT scan of the head was obtained with 3 mm axial soft tissue algorithm images. No intravenous contrast was administered. Sagittal and coronal reconstructions were obtained.  COMPARISON: No previous similar studies are available for comparison.  FINDINGS:   There is a hypodensity within the  posterior and medial aspect of the left cerebellar hemisphere which measures up to approximately 2.9 x 4.9 cm in greatest axial dimensions and is compatible with an acute to subacute infarct within the left PICA distribution. There is mass effect upon the fourth ventricle which is effaced. However, there is no evidence for obstructive hydrocephalus at this time. There is no evidence for hemorrhagic transformation.  Otherwise, the ventricles, sulci, and cisterns are age-appropriate. The basal ganglia and thalami are unremarkable in appearance.       There are findings most compatible with an acute to subacute infarct within the posterior and medial aspect of the left cerebellar hemisphere within the left PICA distribution. This measures up to 4.9 x 2.9 cm in greatest axial dimensions. There is mass effect upon the fourth ventricle which is effaced although there is no evidence for obstructive hydrocephalus at this time. These findings were discussed with Alissa Nieves on 04/03/2025 at approximately 3:55 p.m.   Radiation dose reduction techniques were utilized, including automated exposure control and exposure modulation based on body size.         MEDICATIONS GIVEN IN ER  Medications   sodium chloride 0.9 % flush 10 mL (has no administration in time range)   ketorolac (TORADOL) injection 30 mg (30 mg Intramuscular Given 4/3/24 1444)   diphenhydrAMINE (BENADRYL) injection 25 mg (25 mg Intravenous Given 4/3/24 1602)   metoclopramide (REGLAN) injection 10 mg (10 mg Intravenous Given 4/3/24 1602)         ORDERS PLACED DURING THIS VISIT:  Orders Placed This Encounter   Procedures    COVID-19, FLU A/B, RSV PCR 1 HR TAT - Swab, Nasopharynx    CT Head Without Contrast    CT Angiogram Head    CT Angiogram Neck    Comprehensive Metabolic Panel    Protime-INR    aPTT    Magnesium    CBC Auto Differential    Inpatient Neurology Consult Stroke    Neurosurgery (on-call MD unless specified)    Pulmonology (on-call MD unless  specified)    ECG 12 Lead Stroke Evaluation    Insert Peripheral IV    Inpatient Admission    CBC & Differential         OUTPATIENT MEDICATION MANAGEMENT:  Current Facility-Administered Medications Ordered in Epic   Medication Dose Route Frequency Provider Last Rate Last Admin    sodium chloride 0.9 % flush 10 mL  10 mL Intravenous PRN Alissa Nieves PA-C         Current Outpatient Medications Ordered in Epic   Medication Sig Dispense Refill    meloxicam (MOBIC) 15 MG tablet Take 1 tablet by mouth Daily. 15 tablet 0    ondansetron (ZOFRAN) 4 MG tablet Take 1 tablet by mouth.           45 minutes of critical care provided. This time excludes other billable procedures. Time does include preparation of documents, medical consultations, review of old records, and direct bedside care. Patient is at high risk for life-threatening deterioration due to acute stroke requiring frequent neurologic assessment, neurology and neurosurgery consultation, and admission to the ICU.         PROGRESS, DATA ANALYSIS, CONSULTS, AND MEDICAL DECISION MAKING  All labs have been independently interpreted by me.  All radiology studies have been reviewed by me. All EKG's have been independently viewed and interpreted by me.  Discussion below represents my analysis of pertinent findings related to patient's condition, differential diagnosis, treatment plan and final disposition.    Differential diagnosis includes but is not limited to intracranial bleed, viral headache, tension headache, sinus infection.    Clinical Scores:            Total (NIH Stroke Scale): 0      ED Course as of 04/03/24 1646   Wed Apr 03, 2024   3369 First Look: Patient presents with symptoms of upper respiratory congestion, diffuse headache that has progressed over the past couple of days.  He has attempted to use ibuprofen without much relief of symptoms.  No reported injury to the head or neck.  Does not take blood thinners.  No numbness or weakness of the face or  extremities, slurred speech, visual disturbance, photophobia.  No sick contacts at home.  He does report that his headache is positional and worse when he bends over. [DC]   1545 CT Head Without Contrast  Per my independent interpretation of the CT head, patient has a left posterior area of encephalomalacia of unclear acuity, no obvious acute intracranial hemorrhage.  Will defer timing and detailed report radiologist. [DC-2]   1555 Preliminary report per Dr. Benton is acute left cerebellar infarct with associated mass effect on the 4th ventricle [MP]   1558 Call placed to stroke neurology [MP]   1606 Dr. Forbes has reviewed CT scan. Recommending admission to ICU. Requesting consult to [facility].  [MP]   1641 I spoke with Tessa with [facility].  She and Dr. Bhatia have reviewed the CT imaging.  They are aware patient is being admitted to ICU and will be available for consult for any change in patient's mental status. [MP]   1642 WBC: 9.27 [DC-2]   1642 Hemoglobin: 16.2 [DC-2]   1642 Glucose(!): 259 [DC-2]   1642 BUN: 12 [DC-2]   1642 Creatinine: 0.88 [DC-2]   1642 Sodium(!): 134 [DC-2]   1642 Potassium: 3.7 [DC-2]   1642 ALT (SGPT): 15 [DC-2]   1642 AST (SGOT): 11 [DC-2]   1642 Alkaline Phosphatase: 66 [DC-2]   1642 Total Bilirubin: 0.7 [DC-2]   1642 RSV, PCR: Not Detected [DC-2]   1642 Influenza B PCR: Not Detected [DC-2]   1642 Influenza A PCR: Not Detected [DC-2]   1642 COVID19: Not Detected [DC-2]   1642 Magnesium: 2.1 [DC-2]   1645 I spoke with Dr. Mack with pulmonology.  Reviewed patient presentation and ED findings.  He agrees to admit patient to an ICU bed. [MP]      ED Course User Index  [DC] Gene Sandoval PA  [DC-2] Harinder Renee MD  [MP] Alissa Nieves PA-C             AS OF 16:46 EDT VITALS:    BP - 132/89  HR - 93  TEMP - 96.7 °F (35.9 °C)  O2 SATS - 98%    COMPLEXITY OF CARE  The patient requires admission.      DIAGNOSIS  Final diagnoses:   Cerebellar stroke, acute         DISPOSITION  ED Disposition        ED Disposition   Decision to Admit    Condition   --    Comment   Level of Care: Critical Care [6]   Diagnosis: Acute CVA (cerebrovascular accident) [[id number]]   Admitting Physician: AUNG AHN [[id number]]   Attending Physician: AUNG AHN [[id number]]   Certification: I Certify That Inpatient Hospital Services Are Medically Necessary For Greater Than 2 Midnights                  Please note that portions of this document were completed with a voice recognition program.    Note Disclaimer: At [facility], we believe that sharing information builds trust and better relationships. You are receiving this note because you recently visited [facility]. It is possible you will see health information before a provider has talked with you about it. This kind of information can be easy to misunderstand. To help you fully understand what it means for your health, we urge you to discuss this note with your provider.         Alissa Nieves PA-C  04/03/24 1805      Electronically signed by Harinder Renee MD at 04/03/24 1808       Tigre Randolph RN at 04/03/24 1327          Patient to ED via PV from home. Patient c/o headache and tingling in left hand. Patient reports he also had a dizzy spell on Sunday but that has resolved.     Electronically signed by Tigre Randolph RN at 04/03/24 1328       Vital Signs (last day)       Date/Time Temp Temp src Pulse Resp BP Patient Position SpO2    04/04/24 0723 98.6 (37) Oral -- -- -- -- --    04/04/24 0701 -- -- 95 -- 130/81 -- 96    04/04/24 0700 -- -- 100 -- 130/81 -- 95    04/04/24 0600 -- -- 78 -- 125/86 -- 95    04/04/24 0530 -- -- 78 -- 115/86 -- 96    04/04/24 0413 97.8 (36.6) Oral -- -- -- -- --    04/04/24 0400 -- -- 86 -- 120/82 -- 95    04/04/24 0300 -- -- 81 -- 109/65 -- 94    04/04/24 0200 -- -- 83 -- 109/64 -- 95    04/04/24 0100 -- -- 87 -- 116/86 -- 95 04/04/24 0000 -- -- 77 -- 111/62 -- 95 04/03/24 2353 97.9 (36.6) Oral 81 -- -- -- 94 04/03/24 2300 --  -- 76 -- 124/70 -- 95    04/03/24 2200 -- -- 77 -- 108/67 -- 96    04/03/24 2100 -- -- 78 -- 122/81 -- 94    04/03/24 2000 97.6 (36.4) Oral 80 -- 126/82 -- 95    04/03/24 1901 -- -- 101 -- 150/94 -- 99    04/03/24 1900 -- -- 98 -- -- -- 99    04/03/24 1846 -- -- 90 -- 129/90 -- 99    04/03/24 1839 -- -- 77 -- 134/88 -- 98    04/03/24 1811 -- -- 81 14 136/90 -- 100    04/03/24 1800 -- -- 82 16 131/89 -- 98    04/03/24 1731 -- -- 71 -- 110/67 -- 97    04/03/24 1701 -- -- 79 -- 132/81 -- 99    04/03/24 1631 -- -- 93 -- 132/89 -- 98    04/03/24 1602 -- -- 80 -- 123/90 -- 99    04/03/24 1531 -- -- 82 -- 125/85 -- 96    04/03/24 1501 -- -- 84 -- 135/89 -- 95    04/03/24 1439 -- -- -- -- 134/89 -- --    04/03/24 1328 96.7 (35.9) -- 111 16 -- -- 97          Oxygen Therapy (last day)       Date/Time SpO2 Device (Oxygen Therapy) Flow (L/min) Oxygen Concentration (%) ETCO2 (mmHg)    04/04/24 0800 -- room air -- -- --    04/04/24 0701 96 -- -- -- --    04/04/24 0700 95 -- -- -- --    04/04/24 0600 95 -- -- -- --    04/04/24 0530 96 -- -- -- --    04/04/24 0400 95 room air -- -- --    04/04/24 0300 94 -- -- -- --    04/04/24 0200 95 -- -- -- --    04/04/24 0100 95 -- -- -- --    04/04/24 0000 95 -- -- -- --    04/03/24 2353 94 -- -- -- --    04/03/24 2300 95 -- -- -- --    04/03/24 2200 96 -- -- -- --    04/03/24 2100 94 -- -- -- --    04/03/24 2000 95 room air -- -- --    04/03/24 1901 99 -- -- -- --    04/03/24 1900 99 -- -- -- --    04/03/24 1846 99 -- -- -- --    04/03/24 1839 98 -- -- -- --    04/03/24 1817 -- room air -- -- --    04/03/24 1811 100 -- -- -- --    04/03/24 1800 98 -- -- -- --    04/03/24 1731 97 -- -- -- --    04/03/24 1701 99 -- -- -- --    04/03/24 1631 98 -- -- -- --    04/03/24 1602 99 -- -- -- --    04/03/24 1531 96 -- -- -- --    04/03/24 1501 95 -- -- -- --    04/03/24 1328 97 room air -- -- --          Lines, Drains & Airways       Active LDAs       Name Placement date Placement time Site Days     Peripheral IV 04/03/24 1601 Right Antecubital 04/03/24  1601  Antecubital  less than 1              Inactive LDAs       None                  Facility-Administered Medications as of 4/4/2024   Medication Dose Route Frequency Provider Last Rate Last Admin    aspirin tablet 325 mg  325 mg Oral Daily Sade Joshi PA-C   325 mg at 04/03/24 2000    Or    aspirin suppository 300 mg  300 mg Rectal Daily Sade Joshi PA-C        atorvastatin (LIPITOR) tablet 80 mg  80 mg Oral Nightly Sade Joshi PA-C   80 mg at 04/03/24 2001    dextrose (D50W) (25 g/50 mL) IV injection 25 g  25 g Intravenous Q15 Min PRN Lavelle Mack MD        dextrose (GLUTOSE) oral gel 15 g  15 g Oral Q15 Min PRN Lavelle Mack MD        [COMPLETED] diphenhydrAMINE (BENADRYL) injection 25 mg  25 mg Intravenous Once Alissa Nieves PA-C   25 mg at 04/03/24 1602    glucagon (GLUCAGEN) injection 1 mg  1 mg Intramuscular Q15 Min PRN Lavelle Mack MD        insulin lispro (HUMALOG/ADMELOG) injection 2-7 Units  2-7 Units Subcutaneous 4x Daily AC & at Bedtime Lavelle Mack MD        [COMPLETED] iopamidol (ISOVUE-370) 76 % injection 100 mL  100 mL Intravenous Once in imaging Lavelle Mack MD   95 mL at 04/03/24 1750    [COMPLETED] ketorolac (TORADOL) injection 30 mg  30 mg Intramuscular Once Gene Sandoval PA   30 mg at 04/03/24 1444    [COMPLETED] metoclopramide (REGLAN) injection 10 mg  10 mg Intravenous Once Alissa Nieves PA-C   10 mg at 04/03/24 1602    sodium chloride 0.9 % flush 10 mL  10 mL Intravenous PRN Lavelle Mack MD        sodium chloride 0.9 % flush 10 mL  10 mL Intravenous Q12H Sade Joshi PA-C   10 mL at 04/03/24 2002    sodium chloride 0.9 % flush 10 mL  10 mL Intravenous PRN Sade Joshi PA-C        sodium chloride 0.9 % infusion 40 mL  40 mL Intravenous PRN Sade Joshi PA-C         Orders (last 24 hrs)        Start     Ordered    04/05/24 0500  CT Head Without Contrast  1 Time Imaging          04/04/24 0912    04/05/24 0001  NPO Diet NPO Type: Strict NPO  Diet Effective Midnight         04/04/24 0912    04/04/24 1100  POC Glucose 4x Daily Before Meals & at Bedtime  4 Times Daily Before Meals & at Bedtime      Comments: Complete no more than 45 minutes prior to patient eating      04/04/24 0733    04/04/24 0913  Inpatient Cardiology Consult  Once        Specialty:  Cardiology  Provider:  (Not yet assigned)    04/04/24 0912    04/04/24 0830  insulin lispro (HUMALOG/ADMELOG) injection 2-7 Units  4 Times Daily Before Meals & Nightly         04/04/24 0733    04/04/24 0823  Diet: Diabetic; Consistent Carbohydrate; Fluid Consistency: Thin (IDDSI 0)  Diet Effective Now         04/04/24 0823    04/04/24 0751  POC Glucose Once  PROCEDURE ONCE        Comments: Complete no more than 45 minutes prior to patient eating      04/04/24 0748    04/04/24 0743  MRI Brain With & Without Contrast  1 Time Imaging         04/04/24 0742    04/04/24 0733  dextrose (GLUTOSE) oral gel 15 g  Every 15 Minutes PRN         04/04/24 0733    04/04/24 0733  dextrose (D50W) (25 g/50 mL) IV injection 25 g  Every 15 Minutes PRN         04/04/24 0733    04/04/24 0733  glucagon (GLUCAGEN) injection 1 mg  Every 15 Minutes PRN         04/04/24 0733    04/04/24 0618  POC Glucose Once  PROCEDURE ONCE        Comments: Complete no more than 45 minutes prior to patient eating      04/04/24 0616    04/04/24 0600  Hemoglobin A1c  Morning Draw         04/03/24 1812    04/04/24 0600  Lipid Panel  Morning Draw         04/03/24 1812    04/04/24 0600  CBC & Differential  Morning Draw         04/04/24 0231    04/04/24 0600  Comprehensive Metabolic Panel  Morning Draw         04/04/24 0231    04/04/24 0600  CBC Auto Differential  PROCEDURE ONCE         04/04/24 0232    04/04/24 0500  CT Head Without Contrast  1 Time Imaging         04/03/24 1813    04/04/24 0001  NPO Diet NPO Type: Strict NPO  Diet Effective Midnight,   Status:  Canceled         04/03/24  "1914    04/04/24 0000  POC Glucose Q6H  Every 6 Hours      Comments: May Discontinue After 2 Consecutive Readings Less Than 140Notify Provider if 2 Readings Greater Than 140      04/03/24 1812 04/03/24 2357  POC Glucose Once  PROCEDURE ONCE        Comments: Complete no more than 45 minutes prior to patient eating      04/03/24 2355    04/03/24 2100  sodium chloride 0.9 % flush 10 mL  Every 12 Hours Scheduled         04/03/24 1812 04/03/24 2100  atorvastatin (LIPITOR) tablet 80 mg  Nightly         04/03/24 1812 04/03/24 2000  Intake and Output  Every 4 Hours       04/03/24 1812 04/03/24 1900  aspirin tablet 325 mg  Daily        Placed in \"Or\" Linked Group    04/03/24 1813    04/03/24 1900  aspirin suppository 300 mg  Daily        Placed in \"Or\" Linked Group    04/03/24 1813    04/03/24 1825  Code Status and Medical Interventions:  Continuous         04/03/24 1824 04/03/24 1821  Diet: Regular/House; Fluid Consistency: Thin (IDDSI 0)  Diet Effective Now,   Status:  Canceled         04/03/24 1820 04/03/24 1814  NIHSS Assessment  Every 12 Hours        Comments: Turn off all sedation medications prior to performing assessment. Assessment to be performed upon admission, transfer to another unit, discharge, and with neurological decline. If NIHSS change is greater than or equal to 4 and/or neurological decline is noted notify physician.    04/03/24 1812 04/03/24 1813  Vital Signs  Per Order Details        Comments: For ICU Admission: Vital Signs Every 2 Hours  For Telemetry Unit Admission: Vital Signs Every 4 Hours    04/03/24 1812 04/03/24 1813  Continuous Pulse Oximetry  Continuous         04/03/24 1812 04/03/24 1813  Continuous Cardiac Monitoring  Continuous        Comments: Follow Standing Orders As Outlined in Process Instructions (Open Order Report to View Full Instructions)    04/03/24 1812 04/03/24 1813  Maintain IV Access  Continuous,   Status:  Canceled         04/03/24 1812    " 04/03/24 1813  Telemetry - Place Orders & Notify Provider of Results When Patient Experiences Acute Chest Pain, Dysrhythmia or Respiratory Distress  Continuous        Comments: Open Order Report to View Parameters Requiring Provider Notification    04/03/24 1812 04/03/24 1813  Notify Provider  Continuous        Comments: Open Order Report to View Parameters Requiring Provider Notification    04/03/24 1812 04/03/24 1813  NPO Diet NPO Type: Strict NPO  Diet Effective Now,   Status:  Canceled        Comments: Strict NPO Until Nursing Dysphagia Screen Passed    04/03/24 1812    04/03/24 1813  Nursing Dysphagia Screening (Complete Prior to Giving Anything By Mouth)  Once         04/03/24 1812 04/03/24 1813  RN to Place Order SLP Consult - Eval & Treat Choosing Reason of RN Dysphagia Screen Failed  Per Order Details        Comments: RN to Place Order SLP Consult - Eval & Treat Choosing Reason of RN Dysphagia Screen Failed    04/03/24 1812 04/03/24 1813  Nurse to Call MD or Nutrition Services for Diet if Patient Passes Dysphagia Screen  Once         04/03/24 1812 04/03/24 1813  Neuro Checks  Per Order Details        Comments: For ICU Admission: Neuro Checks to Include All Stroke Deficits Every Hour x10 Hours, Then Every 2 Hours,  For Telemetry Unit Admission: Neuro Checks to Include All Stroke Deficits Every 4 Hours    04/03/24 1812 04/03/24 1813  Provide Stroke Education Material  Prior to Discharge        Comments: Educate patient PRN and daily during hospitalization.    04/03/24 1812 04/03/24 1813  Notify Stroke Coordinator  Once        Provider:  (Not yet assigned)    04/03/24 1812 04/03/24 1813  Inpatient Rehab Admission Consult  Once        Provider:  (Not yet assigned)    04/03/24 1812    04/03/24 1813  OT Consult: Eval & Treat  Once         04/03/24 1812 04/03/24 1813  PT Consult: Eval & Treat As Tolerated  Once         04/03/24 1812 04/03/24 1813  SLP Consult: Eval & Treat  Communication Disorder  Once        Comments: Per stroke protocol.    04/03/24 1812    04/03/24 1813  Inpatient Case Management  Consult  Once        Provider:  (Not yet assigned)    04/03/24 1812    04/03/24 1813  Inpatient Diabetes Educator Consult  Once        Provider:  (Not yet assigned)    04/03/24 1812    04/03/24 1813  Inpatient Neurology Consult Stroke  Once,   Status:  Canceled        Specialty:  Neurology  Provider:  (Not yet assigned)    04/03/24 1812    04/03/24 1813  Assessed for Rehabilitation Services  Once         04/03/24 1812    04/03/24 1813  Reason for Not Administering IV Thrombolytic  Once         04/03/24 1812    04/03/24 1813  Insert Peripheral IV  Once         04/03/24 1812    04/03/24 1813  Saline Lock & Maintain IV Access  Continuous         04/03/24 1812    04/03/24 1813  Place Sequential Compression Device  Once         04/03/24 1812    04/03/24 1813  Maintain Sequential Compression Device  Continuous         04/03/24 1812 04/03/24 1813  Activity As Tolerated  Until Discontinued         04/03/24 1813    04/03/24 1813  Adult Transthoracic Echo Complete W/ Cont if Necessary Per Protocol (With Agitated Saline)  Once         04/03/24 1813    04/03/24 1812  sodium chloride 0.9 % flush 10 mL  As Needed         04/03/24 1812    04/03/24 1812  sodium chloride 0.9 % infusion 40 mL  As Needed         04/03/24 1812    04/03/24 1809  POC Glucose Once  PROCEDURE ONCE        Comments: Complete no more than 45 minutes prior to patient eating      04/03/24 1807    04/03/24 1806  iopamidol (ISOVUE-370) 76 % injection 100 mL  Once in Imaging         04/03/24 1750    04/03/24 1647  Inpatient Admission  Once         04/03/24 1646    04/03/24 1647  Cardiac Monitoring  Continuous,   Status:  Canceled        Comments: Follow Standing Orders As Outlined in Process Instructions (Open Order Report to View Full Instructions)    04/03/24 1646    04/03/24 1642  ECG 12 Lead Stroke Evaluation  STAT   "       04/03/24 1641    04/03/24 1632  Pulmonology (on-call MD unless specified)  Once        Specialty:  Pulmonary Disease  Provider:  (Not yet assigned)    04/03/24 1631    04/03/24 1618  CT Angiogram Neck  1 Time Imaging         04/03/24 1617    04/03/24 1617  CT Angiogram Head  1 Time Imaging         04/03/24 1617    04/03/24 1609  CT Angiogram Head  1 Time Imaging,   Status:  Canceled         04/03/24 1609    04/03/24 1609  CT Angiogram Neck  1 Time Imaging,   Status:  Canceled         04/03/24 1609    04/03/24 1609  Neurosurgery (on-call MD unless specified)  Once        Specialty:  Neurosurgery  Provider:  (Not yet assigned)    04/03/24 1609    04/03/24 1557  Inpatient Neurology Consult Stroke  STAT        Specialty:  Neurology  Provider:  (Not yet assigned)    04/03/24 1556    04/03/24 1533  diphenhydrAMINE (BENADRYL) injection 25 mg  Once         04/03/24 1517    04/03/24 1533  metoclopramide (REGLAN) injection 10 mg  Once         04/03/24 1517    04/03/24 1517  Magnesium  Once         04/03/24 1517    04/03/24 1515  CBC & Differential  Once         04/03/24 1517    04/03/24 1515  Comprehensive Metabolic Panel  Once         04/03/24 1517    04/03/24 1515  Protime-INR  Once         04/03/24 1517    04/03/24 1515  aPTT  Once         04/03/24 1517    04/03/24 1515  Insert Peripheral IV  Once        Placed in \"And\" Linked Group    04/03/24 1517    04/03/24 1515  CBC Auto Differential  PROCEDURE ONCE         04/03/24 1517    04/03/24 1514  sodium chloride 0.9 % flush 10 mL  As Needed        Placed in \"And\" Linked Group    04/03/24 1517    04/03/24 1444  ketorolac (TORADOL) injection 30 mg  Once         04/03/24 1428    04/03/24 1428  CT Head Without Contrast  1 Time Imaging         04/03/24 1428    04/03/24 1428  COVID-19, FLU A/B, RSV PCR 1 HR TAT - Swab, Nasopharynx  Once         04/03/24 1428    Unscheduled  Order CT Head Without Contrast for Neurological Decline  As Needed       04/03/24 1812    Unscheduled  " "Follow Hypoglycemia Standing Orders For Blood Glucose <70 & Notify Provider of Treatment  As Needed      Comments: Follow Hypoglycemia Orders As Outlined in Process Instructions (Open Order Report to View Full Instructions)  Notify Provider Any Time Hypoglycemia Treatment is Administered    24 0733                     Physician Progress Notes (all)        Saroj Forbes MD at 24 0912          DOS: 2024  NAME: Raghav Loco   : 1980  PCP: Provider, No Known  Chief Complaint   Patient presents with    Hypertension    Headache    Eye Problem       Chief complaint: stroke  Subjective: 43-year-old man with history of hypertension, diabetes, tobacco abuse, prior CAD with multiple reported MIs who presented to the hospital last night complaining of headache.  He describes acute onset of ataxia and vertigo on .  Following that he felt his balance improved but his headache worsened.  In the emergency department his CT scan showed a completed left inferior cerebellar stroke with mass effect on the fourth ventricle.  Overnight his headache has persisted and worsened early this morning.    Objective:  Vital signs: /81   Pulse 95   Temp 98.6 °F (37 °C) (Oral)   Resp 14   Ht 172.7 cm (68\")   Wt 68.3 kg (150 lb 9.2 oz)   SpO2 96%   BMI 22.89 kg/m²    Gen: NAD, vitals reviewed  MS: oriented x3, recent/remote memory intact, normal attention/concentration, language intact, no neglect.  CN: visual acuity grossly normal, PERRL, EOMI without nystagmus, no facial droop, no dysarthria  Motor: 5/5 throughout upper and lower extremities, normal tone  Coordination: No dysmetria or overshoot    Laboratory results:  Lab Results   Component Value Date    GLUCOSE 236 (H) 2024    CALCIUM 8.9 2024     2024    K 3.6 2024    CO2 25.1 2024     2024    BUN 16 2024    CREATININE 0.79 2024    EGFRIFAFRI >60 2023    EGFRIFNONA " 107 12/31/2019    BCR 20.3 04/04/2024    ANIONGAP 10.9 04/04/2024     Lab Results   Component Value Date    WBC 8.58 04/04/2024    HGB 15.2 04/04/2024    HCT 46.1 04/04/2024    MCV 88.0 04/04/2024     04/04/2024     Lab Results   Component Value Date     (H) 04/04/2024    LDL 66 01/09/2019    LDL 85 01/08/2019         Lab 04/04/24  0332   HEMOGLOBIN A1C 9.70*        Review of labs: CBC, BMP unremarkable, , hemoglobin A1c 9.7%    Review and interpretation of imaging: I personally reviewed his CT from the emergency department as well as his CTA head and neck and his repeat head CT done this morning.  Initial CT shows a left inferior cerebellar stroke with mass effect on the fourth ventricle without evidence of significant hydrocephalus.  Repeat CT this morning was stable.  CTA does not show any flow-limiting stenosis in the extracranial or intracranial circulation on my review.  Radiology reports reviewed.  Contrasted MRI of the brain ordered.    2D echo pending    Diagnoses:  Stroke, left posterior inferior cerebellar artery, embolic  Coronary artery disease, without angina, native artery  Mixed hyperlipidemia  Non-insulin-dependent diabetes  Tobacco abuse  Brain compression    Comment: Cryptogenic left cerebellar infarct.  CTA negative for large vessel source.  I suspect this is cardioembolic.    Plan:  1.  Aspirin, statin  2.  2D echo.  Cardiology consult for ANDRA tomorrow.  N.p.o. after midnight  3.  Contrasted MRI of the brain  4.  Repeat head CT tomorrow a.m.  5.  Reduce neurochecks to q2H    Management discussed with patient, spouse, nursing staff.    High risk. Cryptogenic cerebellar stroke with mass effect.      Electronically signed by Saroj Forbes MD at 04/04/24 0916       Naren Connell MD at 04/04/24 0733              [facility] INPATIENT PROGRESS NOTE         [facility] INTENSIVE CARE    4/4/2024      PATIENT IDENTIFICATION:  Name: Raghav Loco  ADMIT: 4/3/2024   : 1980  PCP: Pina, No Known    MRN: [id number] LOS: 1 days   AGE/SEX: 43 y.o. male  ROOM: [facility]                     LOS 1    Reason for visit: ICU medical management with acute/subacute left cerebellar stroke      SUBJECTIVE:      Dizzy symptoms resolved.  Denies any weakness, vision changes.  No shortness of breath or cough.  Not requiring any drips.  On room air.  Awaiting further neurology input.  Discussed with family at bedside. I am seeing the patient for the first time today.  All patient problems are new to me.      Objective   OBJECTIVE:    Vital Sign Min/Max for last 24 hours  Temp  Min: 96.7 °F (35.9 °C)  Max: 98.6 °F (37 °C)   BP  Min: 108/67  Max: 150/94   Pulse  Min: 71  Max: 111   Resp  Min: 14  Max: 16   SpO2  Min: 94 %  Max: 100 %   No data recorded   Weight  Min: 63.5 kg (140 lb)  Max: 68.3 kg (150 lb 9.2 oz)    Vitals:    24 0600 24 0700 24 0701 24 0723   BP: 125/86 130/81 130/81    Pulse: 78 100 95    Resp:       Temp:    98.6 °F (37 °C)   TempSrc:    Oral   SpO2: 95% 95% 96%    Weight: 68.3 kg (150 lb 9.2 oz)      Height:                24  1439 24  0600   Weight: 63.5 kg (140 lb) 68.3 kg (150 lb 9.2 oz)       Body mass index is 22.89 kg/m².                          Body mass index is 22.89 kg/m².    Intake/Output Summary (Last 24 hours) at 2024 0941  Last data filed at 2024 0200  Gross per 24 hour   Intake 300 ml   Output 550 ml   Net -250 ml         Exam:  GEN:  No distress, appears stated age  EYES:   PERRL, anicteric sclerae  ENT:    External ears/nose normal, OP clear  NECK:  No adenopathy, midline trachea  LUNGS: Normal chest on inspection, palpation and auscultation  CV:  Normal S1S2, without murmur  ABD:  Nontender, nondistended, no hepatosplenomegaly, +BS  EXT:  No edema.  No cyanosis or clubbing.  No mottling and normal cap refill.    Assessment     Scheduled meds:  aspirin, 325 mg, Oral, Daily   Or  aspirin,  300 mg, Rectal, Daily  atorvastatin, 80 mg, Oral, Nightly  insulin lispro, 2-7 Units, Subcutaneous, 4x Daily AC & at Bedtime  sodium chloride, 10 mL, Intravenous, Q12H      IV meds:                         Data Review:  Results from last 7 days   Lab Units 04/04/24  0332 04/03/24  1600   SODIUM mmol/L 140 134*   POTASSIUM mmol/L 3.6 3.7   CHLORIDE mmol/L 104 102   CO2 mmol/L 25.1 22.6   BUN mg/dL 16 12   CREATININE mg/dL 0.79 0.88   GLUCOSE mg/dL 236* 259*   CALCIUM mg/dL 8.9 9.5         Estimated Creatinine Clearance: 114.1 mL/min (by C-G formula based on SCr of 0.79 mg/dL).  Results from last 7 days   Lab Units 04/04/24  0332 04/03/24  1600   WBC 10*3/mm3 8.58 9.27   HEMOGLOBIN g/dL 15.2 16.2   PLATELETS 10*3/mm3 180 170     Results from last 7 days   Lab Units 04/03/24  1600   INR  1.10     Results from last 7 days   Lab Units 04/04/24  0332 04/03/24  1600   ALT (SGPT) U/L 8 15   AST (SGOT) U/L 11 11                 Hemoglobin A1C   Date/Time Value Ref Range Status   04/04/2024 0332 9.70 (H) 4.80 - 5.60 % Final     Glucose   Date/Time Value Ref Range Status   04/04/2024 0748 197 (H) 70 - 130 mg/dL Final   04/04/2024 0616 204 (H) 70 - 130 mg/dL Final   04/03/2024 2355 268 (H) 70 - 130 mg/dL Final   04/03/2024 1807 215 (H) 70 - 130 mg/dL Final         Imaging reviewed  Repeat CT head 4/4 reviewed    Microbiology reviewed  RVP negative         Active Hospital Problems    Diagnosis  POA    **Acute CVA (cerebrovascular accident) [I63.9]  Yes      Resolved Hospital Problems   No resolved problems to display.         ASSESSMENT:  Acute/subacute left cerebellar stroke, left PICA distribution, 4.9 x 2.9 cm  Brain compression with mass effect and compression of the fourth ventricle but no hydrocephalus.   Uncontrolled DM type II, A1c 10.5 on 11/30/2023  HTN  Noncompliance with medical therapy      PLAN:  Neurochecks per protocol.  2D echo and cardiology consulted for ANDRA.  Brain MRI.  Plan repeat CT head tomorrow.  Control  blood pressure.  Control glucose.  Aspirin and statin for secondary stroke prevention.  Mechanical DVT prophylaxis.      Naren Connell MD  Pulmonary and Critical Care Medicine  [facility], [facility]  4/4/2024    09:41 EDT       Electronically signed by Naren Connell MD at 04/04/24 0943          Consult Notes (all)        Sade Joshi PA-C at 04/03/24 1820        Consult Orders    1. Inpatient Neurology Consult Stroke [[id number]] ordered by Alissa Nieves PA-C at 04/03/24 1556              Attestation signed by Saroj Forbes MD at 04/04/24 0713    I have reviewed this documentation and agree.                  Neurology Consult Note    Consult Date: 4/3/2024    Referring MD: Lavelle Mack MD    Reason for Consult I have been asked to see the patient in neurological consultation to render advice and opinion regarding stroke.    Raghav Loco is a 43 y.o. male with PMH of multiple AMI, diabetes, HTN, HLD presents to the ED with acute headache. Patient reports he had sudden dizziness like the room was spinning on Sunday when he was using the restroom. Patient states that he had a little bit of trouble walking back to his bed to lay down for a nap. When he woke up he had a posterior headache. He describes this as a pressure and states that it has been present since. Patient admits to associated N/V with last episode being last night. Patient denies feeling nauseous currently. Patient admits to intermittent numbness and tingling of his left arm since symptoms started. Patient denies weakness, slurred speech or speaking difficulty, blurry vision, double vision, or vision loss. Patient admits to being non-compliant with all medications. Patient smokes about 1 PPD x 29 years. Patient blood pressure on arrival was 134/89, pulse 111, glucose: 259. Patient denies history of heart arrhthymias, palpitations, chest pain currently or SOA.     Past Medical/Surgical Hx:  Past Medical  "History:   Diagnosis Date    Coronary artery disease     Diabetes mellitus      Past Surgical History:   Procedure Laterality Date    CORONARY ANGIOPLASTY WITH STENT PLACEMENT         Medications On Admission  Medications Prior to Admission   Medication Sig Dispense Refill Last Dose    meloxicam (MOBIC) 15 MG tablet Take 1 tablet by mouth Daily. 15 tablet 0     ondansetron (ZOFRAN) 4 MG tablet Take 1 tablet by mouth.          Allergies:  No Known Allergies    Social Hx:  Social History     Socioeconomic History    Marital status: Single   Tobacco Use    Smoking status: Every Day    Smokeless tobacco: Never   Substance and Sexual Activity    Alcohol use: Never    Drug use: Never       Family Hx:  History reviewed. No pertinent family history.        Exam    /89   Pulse 82   Temp 96.7 °F (35.9 °C)   Resp 16   Ht 172.7 cm (68\")   Wt 63.5 kg (140 lb)   SpO2 98%   BMI 21.29 kg/m²   gen: NAD, vitals reviewed  MS: oriented x3, recent/remote memory intact, normal attention/concentration, language intact, no neglect, normal fund of knowledge  CN: visual acuity grossly normal, visual fields full, PERRL, EOMI, facial sensation equal, no facial droop, hearing symmetric, palate elevates symmetrically, shoulder shrug equal, tongue midline  Motor: 5/5 throughout upper and lower extremities, normal tone  Sensation: intact to light touch and temperature throughout  Coordination: mild dysmetria in left upper extremity and left lower extremity    DATA:    Lab Results   Component Value Date    GLUCOSE 259 (H) 04/03/2024    CALCIUM 9.5 04/03/2024     (L) 04/03/2024    K 3.7 04/03/2024    CO2 22.6 04/03/2024     04/03/2024    BUN 12 04/03/2024    CREATININE 0.88 04/03/2024    EGFRIFAFRI >60 02/21/2023    EGFRIFNONA 107 12/31/2019    BCR 13.6 04/03/2024    ANIONGAP 9.4 04/03/2024     Lab Results   Component Value Date    WBC 9.27 04/03/2024    HGB 16.2 04/03/2024    HCT 48.8 04/03/2024    MCV 90.2 04/03/2024    "  04/03/2024     Lab Results   Component Value Date    LDL 66 01/09/2019    LDL 85 01/08/2019     (H) 11/24/2018     Lab Results   Component Value Date    HGBA1C 10.5 (H) 11/30/2023     Lab Results   Component Value Date    INR 1.10 04/03/2024    INR 1.0 03/15/2022    INR 1.0 10/29/2021    PROTIME 14.5 (H) 04/03/2024    PROTIME 11.6 03/15/2022    PROTIME 12.2 10/29/2021       Lab review: glucose: 259, sodium 134,     Imaging review:   CT head without contrast:  FINDINGS:    There is a hypodensity within the posterior and medial aspect of the  left cerebellar hemisphere which measures up to approximately 2.9 x 4.9  cm in greatest axial dimensions and is compatible with an acute to  subacute infarct within the left PICA distribution. There is mass effect  upon the fourth ventricle which is effaced. However, there is no  evidence for obstructive hydrocephalus at this time. There is no  evidence for hemorrhagic transformation.  Otherwise, the ventricles, sulci, and cisterns are age-appropriate. The  basal ganglia and thalami are unremarkable in appearance.  IMPRESSION:  There are findings most compatible with an acute to subacute infarct  within the posterior and medial aspect of the left cerebellar hemisphere  within the left PICA distribution. This measures up to 4.9 x 2.9 cm in  greatest axial dimensions. There is mass effect upon the fourth  ventricle which is effaced although there is no evidence for obstructive  hydrocephalus at this time. These findings were discussed with Alissa Nieves on 04/03/2025 at approximately 3:55 p.m.       CTA head and neck:  CTA NECK: The aortic arch has a classic configuration. There is no  significant great vessel origin stenosis. The vertebral arteries are  unremarkable. There is no significant NASCET stenosis within either  internal carotid artery.  CTA HEAD: The vertebral arteries, basilar artery, and posterior cerebral  arteries are unremarkable in appearance. The  internal carotids, middle  cerebral arteries, and anterior cerebral arteries are unremarkable.  Again noted are findings most compatible with an acute to subacute  infarct within the posterior medial aspect of the left cerebellar  hemisphere within the left PICA distribution. This infarct measures up  to approximately 4.9 x 2.9 cm in greatest axial dimensions. Again noted  is effacement of the fourth ventricle. There is no evidence for  hydrocephalus at this time. Similar findings were noted on the previous  exam.  IMPRESSION:  Again noted is an acute to subacute infarct involving the posterior and  medial aspect of the left cerebellar hemisphere within the left PICA  distribution. There is mass effect upon the fourth ventricle which is  stable when compared to the head CT performed earlier today. Again,  there is no evidence for hydrocephalus at this time.    EKG: probable left atrial enlargement, old anteroseptal infarct    Diagnoses:  Acute left cerebellar stroke  HTN  HLD  Diabetes  CAD  Hyponatremia    Comment: Patient with acute left cerebellar infarct measuring 4.9 x 2.9 cm on CT head without contrast. Patient has had dizziness and gait imbalance since Sunday that has been constant. Patient with persistent posterior pressure headache without any nausea currently but did have associated N/V yesterday. Patient with mild LLE and LUE ataxia on examination. Patient should be admitted to ICU for monitoring with STAT head CT ordered with neurological change. Will repeat head CT without contrast in morning. Neurosurgery consulted and states there is nothing acute from a neurological standpoint but agree with ICU observation and maintenance of serum sodium >145. Neurosurgery should be consulted if warranted.     Pre-stroke mRS: 1  NIHSS: 2    PLAN:   -Admit to ICU for close monitoring, q 1 hour checks, any neurological change, STAT CT  -CT head without contrast tomorrow morning  -MRI brain with and without  contrast  -Give aspirin now; if cannot swallow give CA  -Maintain permissive HTN for 24-48hrs, do not treat unless BP >180/105 if no contraindication  -Perform bedside swallow test  -Telemetry to monitor for arrhythmia  -VTE prophylaxis  -Neuro checks, if change in exam call neuro oncall  -PT/OT when appropriate   -TTE    Recommendations discussed with Dr. Forbes and he is in agreement with plan.     Electronically signed by Saroj Forbes MD at 04/04/24 0713       Araceli Carpenter APRN at 04/03/24 5112        Consult Orders    1. Neurosurgery (on-call MD unless specified) [[id number]] ordered by Alissa Nieves PA-C at 04/03/24 1603                 HUBER:  Called by ER provider at the request of neurology, Dr. Forbes, to review CTH.  Patient with acute left cerebellar stroke resulting in effacement of the fourth ventricle but no overt hydrocephalus.  May be some slight enlargement of temporal tips.  Patient is awake and alert with only complaint is headache per ER provider.  Discussed with Dr. Bhatia and Andrei.  Nothing acute from neurosurgical standpoint. Agree with ICU observation. Recommend repeat CT head in a.m. (or sooner for neurologic change) and maintain serum sodium >145.  Although we are not seeing the patient formally at this time, we are aware of and readily available if his condition warrants.  Neurology will notify us if needed.    Electronically signed by Araceli Carpenter APRN at 04/03/24 3222

## 2024-04-04 NOTE — PLAN OF CARE
Goal Outcome Evaluation:      No neuro changes today. Medicated with hydrocodone 5mg x2 for H/A. MRI and echo done today. Diabetes educator here, pt receptive to teaching. Plan for CT and ANDRA tomorrow. Family @ [facility].

## 2024-04-04 NOTE — CASE MANAGEMENT/SOCIAL WORK
Discharge Planning Assessment  [facility]     Patient Name: Raghav Loco  MRN: [id number]  Today's Date: 4/4/2024    Admit Date: 4/3/2024    Plan: Home with significant other   Discharge Needs Assessment       Row Name 04/04/24 1408       Living Environment    People in Home significant other    Current Living Arrangements home    Potentially Unsafe Housing Conditions none    Primary Care Provided by self    Provides Primary Care For no one    Family Caregiver if Needed significant other    Quality of Family Relationships involved;helpful;supportive    Able to Return to Prior Arrangements yes       Resource/Environmental Concerns    Resource/Environmental Concerns none       Transition Planning    Patient/Family Anticipates Transition to home with family    Patient/Family Anticipated Services at Transition none    Transportation Anticipated family or friend will provide       Discharge Needs Assessment    Readmission Within the Last 30 Days no previous admission in last 30 days    Equipment Currently Used at Home none    Concerns to be Addressed no discharge needs identified;denies needs/concerns at this time    Anticipated Changes Related to Illness none    Equipment Needed After Discharge none                   Discharge Plan       Row Name 04/04/24 1408       Plan    Plan Home with significant other    Plan Comments CCP met with patient and patient's significant other, Mary at bedside. CCP role explained and discharge planning discussed. Face sheet verified and patient's PCP is through [facility]. Patient lives with his significant other, with no interior/exterior steps. Patient is independent with his ADLs and does not use DME. Patient has not used home health or been to SNF. Per notes; PT signed off. Patient plans to return home at discharge and does not anticipate any discharge needs. CCP will follow for any needs to arise. Chanda OLIVO                  Continued Care and Services - Admitted Since  4/3/2024    No active coordination exists for this encounter.          Demographic Summary       Row Name 04/04/24 1407       General Information    Admission Type inpatient    Arrived From emergency department    Referral Source admission list    Reason for Consult discharge planning    Preferred Language English                   Functional Status       Row Name 04/04/24 1407       Functional Status    Usual Activity Tolerance good    Current Activity Tolerance good       Functional Status, IADL    Medications independent    Meal Preparation independent    Housekeeping independent    Laundry independent    Shopping independent       Mental Status    General Appearance WDL WDL       Mental Status Summary    Recent Changes in Mental Status/Cognitive Functioning no changes                   Psychosocial    No documentation.                  Abuse/Neglect    No documentation.                  Legal    No documentation.                  Substance Abuse    No documentation.                  Patient Forms    No documentation.                     PALLAVI Teran

## 2024-04-05 ENCOUNTER — APPOINTMENT (OUTPATIENT)
Dept: CT IMAGING | Facility: HOSPITAL | Age: 44
DRG: 064 | End: 2024-04-05
Payer: COMMERCIAL

## 2024-04-05 LAB
ANION GAP SERPL CALCULATED.3IONS-SCNC: 10.1 MMOL/L (ref 5–15)
ANION GAP SERPL CALCULATED.3IONS-SCNC: 11.5 MMOL/L (ref 5–15)
ANION GAP SERPL CALCULATED.3IONS-SCNC: 9 MMOL/L (ref 5–15)
BUN SERPL-MCNC: 12 MG/DL (ref 6–20)
BUN SERPL-MCNC: 13 MG/DL (ref 6–20)
BUN SERPL-MCNC: 17 MG/DL (ref 6–20)
BUN/CREAT SERPL: 18.2 (ref 7–25)
BUN/CREAT SERPL: 18.3 (ref 7–25)
BUN/CREAT SERPL: 24.3 (ref 7–25)
CALCIUM SPEC-SCNC: 8.8 MG/DL (ref 8.6–10.5)
CALCIUM SPEC-SCNC: 8.9 MG/DL (ref 8.6–10.5)
CALCIUM SPEC-SCNC: 9.1 MG/DL (ref 8.6–10.5)
CHLORIDE SERPL-SCNC: 103 MMOL/L (ref 98–107)
CHLORIDE SERPL-SCNC: 104 MMOL/L (ref 98–107)
CHLORIDE SERPL-SCNC: 106 MMOL/L (ref 98–107)
CO2 SERPL-SCNC: 20.5 MMOL/L (ref 22–29)
CO2 SERPL-SCNC: 21.9 MMOL/L (ref 22–29)
CO2 SERPL-SCNC: 24 MMOL/L (ref 22–29)
CREAT SERPL-MCNC: 0.66 MG/DL (ref 0.76–1.27)
CREAT SERPL-MCNC: 0.7 MG/DL (ref 0.76–1.27)
CREAT SERPL-MCNC: 0.71 MG/DL (ref 0.76–1.27)
EGFRCR SERPLBLD CKD-EPI 2021: 115.3 ML/MIN/1.73
EGFRCR SERPLBLD CKD-EPI 2021: 115.8 ML/MIN/1.73
EGFRCR SERPLBLD CKD-EPI 2021: 117.9 ML/MIN/1.73
GLUCOSE BLDC GLUCOMTR-MCNC: 145 MG/DL (ref 70–130)
GLUCOSE BLDC GLUCOMTR-MCNC: 179 MG/DL (ref 70–130)
GLUCOSE BLDC GLUCOMTR-MCNC: 198 MG/DL (ref 70–130)
GLUCOSE BLDC GLUCOMTR-MCNC: 203 MG/DL (ref 70–130)
GLUCOSE BLDC GLUCOMTR-MCNC: 254 MG/DL (ref 70–130)
GLUCOSE SERPL-MCNC: 189 MG/DL (ref 65–99)
GLUCOSE SERPL-MCNC: 214 MG/DL (ref 65–99)
GLUCOSE SERPL-MCNC: 243 MG/DL (ref 65–99)
NT-PROBNP SERPL-MCNC: 111 PG/ML (ref 0–450)
OSMOLALITY SERPL: 292 MOSM/KG (ref 275–300)
OSMOLALITY SERPL: 293 MOSM/KG (ref 275–300)
OSMOLALITY UR: 1063 MOSM/KG
POTASSIUM SERPL-SCNC: 4.1 MMOL/L (ref 3.5–5.2)
POTASSIUM SERPL-SCNC: 4.2 MMOL/L (ref 3.5–5.2)
POTASSIUM SERPL-SCNC: 4.2 MMOL/L (ref 3.5–5.2)
SODIUM SERPL-SCNC: 135 MMOL/L (ref 136–145)
SODIUM SERPL-SCNC: 136 MMOL/L (ref 136–145)
SODIUM SERPL-SCNC: 137 MMOL/L (ref 136–145)
SODIUM SERPL-SCNC: 138 MMOL/L (ref 136–145)
SODIUM SERPL-SCNC: 139 MMOL/L (ref 136–145)

## 2024-04-05 PROCEDURE — 99232 SBSQ HOSP IP/OBS MODERATE 35: CPT | Performed by: STUDENT IN AN ORGANIZED HEALTH CARE EDUCATION/TRAINING PROGRAM

## 2024-04-05 PROCEDURE — 80048 BASIC METABOLIC PNL TOTAL CA: CPT | Performed by: STUDENT IN AN ORGANIZED HEALTH CARE EDUCATION/TRAINING PROGRAM

## 2024-04-05 PROCEDURE — 63710000001 INSULIN LISPRO (HUMAN) PER 5 UNITS: Performed by: INTERNAL MEDICINE

## 2024-04-05 PROCEDURE — 25810000003 SODIUM CHLORIDE 3 % SOLUTION: Performed by: STUDENT IN AN ORGANIZED HEALTH CARE EDUCATION/TRAINING PROGRAM

## 2024-04-05 PROCEDURE — 83880 ASSAY OF NATRIURETIC PEPTIDE: CPT | Performed by: STUDENT IN AN ORGANIZED HEALTH CARE EDUCATION/TRAINING PROGRAM

## 2024-04-05 PROCEDURE — 99254 IP/OBS CNSLTJ NEW/EST MOD 60: CPT | Performed by: INTERNAL MEDICINE

## 2024-04-05 PROCEDURE — 70450 CT HEAD/BRAIN W/O DYE: CPT

## 2024-04-05 PROCEDURE — 84295 ASSAY OF SERUM SODIUM: CPT | Performed by: STUDENT IN AN ORGANIZED HEALTH CARE EDUCATION/TRAINING PROGRAM

## 2024-04-05 PROCEDURE — 83935 ASSAY OF URINE OSMOLALITY: CPT | Performed by: STUDENT IN AN ORGANIZED HEALTH CARE EDUCATION/TRAINING PROGRAM

## 2024-04-05 PROCEDURE — 82948 REAGENT STRIP/BLOOD GLUCOSE: CPT

## 2024-04-05 PROCEDURE — 80048 BASIC METABOLIC PNL TOTAL CA: CPT | Performed by: INTERNAL MEDICINE

## 2024-04-05 PROCEDURE — 83930 ASSAY OF BLOOD OSMOLALITY: CPT | Performed by: STUDENT IN AN ORGANIZED HEALTH CARE EDUCATION/TRAINING PROGRAM

## 2024-04-05 RX ORDER — 3% SODIUM CHLORIDE 3 G/100ML
40 INJECTION, SOLUTION INTRAVENOUS CONTINUOUS
Status: DISPENSED | OUTPATIENT
Start: 2024-04-05 | End: 2024-04-06

## 2024-04-05 RX ORDER — INSULIN LISPRO 100 [IU]/ML
3-14 INJECTION, SOLUTION INTRAVENOUS; SUBCUTANEOUS
Status: DISCONTINUED | OUTPATIENT
Start: 2024-04-05 | End: 2024-04-07 | Stop reason: HOSPADM

## 2024-04-05 RX ADMIN — ATORVASTATIN CALCIUM 80 MG: 80 TABLET, FILM COATED ORAL at 20:17

## 2024-04-05 RX ADMIN — ACETAMINOPHEN 325MG 650 MG: 325 TABLET ORAL at 07:16

## 2024-04-05 RX ADMIN — Medication 10 ML: at 08:58

## 2024-04-05 RX ADMIN — SODIUM CHLORIDE 30 ML/HR: 3 INJECTION, SOLUTION INTRAVENOUS at 12:24

## 2024-04-05 RX ADMIN — INSULIN LISPRO 3 UNITS: 100 INJECTION, SOLUTION INTRAVENOUS; SUBCUTANEOUS at 08:48

## 2024-04-05 RX ADMIN — Medication 10 ML: at 20:17

## 2024-04-05 RX ADMIN — INSULIN LISPRO 3 UNITS: 100 INJECTION, SOLUTION INTRAVENOUS; SUBCUTANEOUS at 11:42

## 2024-04-05 RX ADMIN — ASPIRIN 325 MG: 325 TABLET ORAL at 08:47

## 2024-04-05 RX ADMIN — INSULIN LISPRO 3 UNITS: 100 INJECTION, SOLUTION INTRAVENOUS; SUBCUTANEOUS at 21:18

## 2024-04-05 NOTE — PLAN OF CARE
Goal Outcome Evaluation:         VSS. Diet Advanced to Diabetic/Healthy heart. Pt started on 3% Hypertonic Saline. No change in neuro status.

## 2024-04-05 NOTE — PROGRESS NOTES
Came to see patient but he was an MRI.  Will see in AM.  Will start him on low-dose losartan and beta-blocker therapy.

## 2024-04-05 NOTE — PROGRESS NOTES
"DOS: 2024  NAME: Raghav Loco   : 1980  PCP: Provider, No Known  Chief Complaint   Patient presents with    Hypertension    Headache    Eye Problem       Chief complaint: Dizziness and gait imbalance    Subjective: Patient has been seen and evaluated, no acute events overnight.  This is my first day evaluating the patient.     Initial consult note  Is a 43-year-old with history of diabetes, hypertension, hyperlipidemia presented with acute headache and sudden onset dizziness with nausea vomiting.  Initial CT scan itself showed acute left cerebellar infarct.  Initial concern for herniation patient was admitted in the ICU for close observation along with serum sodium levels greater than 145.      Objective:  Vital signs: /79   Pulse 71   Temp 98.5 °F (36.9 °C)   Resp 14   Ht 172.7 cm (68\")   Wt 66.8 kg (147 lb 4.3 oz)   SpO2 94%   BMI 22.39 kg/m²    Gen: NAD, vitals reviewed  MS: oriented x3, recent/remote memory intact, normal attention/concentration, language intact, no neglect.  CN: visual acuity grossly normal, PERRL, EOMI, no facial droop, no dysarthria  Motor: 5/5 throughout upper and lower extremities, normal tone  Sensory: intact to light touch all 4 ext.    Laboratory results:  Lab Results   Component Value Date    GLUCOSE 243 (H) 2024    CALCIUM 9.1 2024     2024    K 4.2 2024    CO2 20.5 (L) 2024     2024    BUN 12 2024    CREATININE 0.66 (L) 2024    EGFRIFAFRI >60 2023    EGFRIFNONA 107 2019    BCR 18.2 2024    ANIONGAP 11.5 2024     Lab Results   Component Value Date    WBC 8.58 2024    HGB 15.2 2024    HCT 46.1 2024    MCV 88.0 2024     2024     Lab Results   Component Value Date     (H) 2024    LDL 66 2019    LDL 85 2019         Lab 24  0332   HEMOGLOBIN A1C 9.70*        Review of labs:   Sodium 136  Creatinine " 0.66  Blood glucose 243  Normal LFTs    WBC 8.58  Hemoglobin 15.1 platelets 180    A1c 9.7  B12 363      Review and interpretation of imaging:     CTA head and neck done on 4/3/2024 did not show any significant atherosclerosis stenosis or occlusion    CT head done on 4/3/2024 showed left cerebellar infarct    MRI brain done on 4/4/2024 diffusion restriction along the left cerebellar region along with mild petechial changes on SWI no other T2 flair changes or no other strokes appreciated    TTE -  Left ventricular systolic function is moderately decreased. Left ventricular ejection fraction appears to be 31 - 35%.  There is a fixed echodensity in the apex.  Structure may be consistent with hypertrophic trabeculation however cannot exclude thrombus.    The left ventricular cavity is moderately dilated.    Left ventricular diastolic function is consistent with (grade I) impaired relaxation.    Moderate patent foramen ovale present.    Saline test results are positive.    Diagnoses:  Acute left cerebellar stroke  Hypertension, on losartan and metoprolol  Hyperlipidemia, on Lipitor 80 mg daily  Tobacco abuse    1.  Acute left cerebellar stroke  Etiology of the stroke most likely is ESUS, most likely based on TTE findings cardioembolic might consider empiric anticoagulation from day 12  Systolic blood pressure goal can be normalized  Patient is currently on aspirin 325 mg daily for secondary stroke prevention  Repeat CT head this morning stable  Will start on hypertonic saline, with sodium at 136, rate at   Patient is also on Lipitor 80 mg daily for secondary stroke prevention  Provided bedside counseling about tobacco cessation  ANDRA being done today.      MDM   Reviewed: Previous charts, nursing notes and vitals   Reviewed: Previous labs and CT scan    Interpretation: Labs and CT scan   Total time providing critical care is :30-74 minutes. This excluded time spent performing separately reportable procedures and  services  Consults :Neurology/Stroke    Please note that portions of this note were completed with a voice recognition program.     Kenny Grey MD  Neuro Hospitalist /Vascular Neurology.

## 2024-04-05 NOTE — PLAN OF CARE
Goal Outcome Evaluation:         No acute events this shifts, CT completed, no neuro changes, NPO since 0000, NIH 0, ANDRA scheduled for today

## 2024-04-05 NOTE — PROGRESS NOTES
"  PROGRESS NOTE  Patient Name: Raghav Loco  Age/Sex: 43 y.o. male  : 1980  MRN: [id number]    Date of Admission: 4/3/2024  Date of Encounter Visit: 24   LOS: 2 days   Patient Care Team:  Provider, No Known as PCP - General    Chief Complaint: Cerebellar infarct    Hospital course: Patient continues to do well, afebrile, on room air, with stable finding on follow-up imaging that showed the cerebellar infarct with some pressure on the fourth ventricle.  Awaiting cardiology assessment for possible ANDRA today.  Patient is n.p.o. for the procedure since  He is walking with PT, denies any balance issues, no problem with coordination or speech or respiration       REVIEW OF SYSTEMS:   CONSTITUTIONAL: no fever or chills  CARDIOVASCULAR: No chest pain, chest pressure or chest discomfort. No palpitations or edema.   RESPIRATORY: No shortness of breath, cough or sputum.   GASTROINTESTINAL: No anorexia, nausea, vomiting or diarrhea. No abdominal pain or blood.   HEMATOLOGIC: No bleeding or bruising.     Ventilator/Non-Invasive Ventilation Settings (From admission, onward)      None              Vital Signs  Temp:  [97.3 °F (36.3 °C)-98.9 °F (37.2 °C)] 98.5 °F (36.9 °C)  Heart Rate:  [] 71  Resp:  [13-16] 14  BP: (102-150)/() 112/79  SpO2:  [90 %-99 %] 94 %  on    Device (Oxygen Therapy): room air    Intake/Output Summary (Last 24 hours) at 2024 0925  Last data filed at 2024 0600  Gross per 24 hour   Intake 1140 ml   Output 1375 ml   Net -235 ml     Flowsheet Rows      Flowsheet Row First Filed Value   Admission Height 172.7 cm (68\") Documented at 2024 1439   Admission Weight 63.5 kg (140 lb) Documented at 2024 1439          Body mass index is 22.39 kg/m².      24  0600 24  1339 24  0600   Weight: 68.3 kg (150 lb 9.2 oz) 68 kg (150 lb) 66.8 kg (147 lb 4.3 oz)       Physical Exam:  GEN:  No acute distress, alert, cooperative, well developed   EYES:   Sclerae " clear. No icterus. PERRL. Normal EOM  ENT:   External ears/nose normal, no oral lesions, no thrush, mucous membranes moist  NECK:  Supple, midline trachea, no JVD  LUNGS: Normal chest on inspection, CTAB, no wheezes. No rhonchi. No crackles. Respirations regular, even and unlabored.   CV:  Regular rhythm and rate. Normal S1/S2. No murmurs, gallops, or rubs noted.  ABD:  Soft, nontender and nondistended. Normal bowel sounds. No guarding  EXT:  Moves all extremities well. No cyanosis. No redness. No edema.   Skin: Dry, intact, no bleeding    Results Review:    Results From Last 14 Days   Lab Units 04/04/24  0332   TRIGLYCERIDES mg/dL 154*     Results from last 7 days   Lab Units 04/05/24  0619 04/04/24  0332 04/03/24  1600   SODIUM mmol/L 136 140 134*   POTASSIUM mmol/L 4.2 3.6 3.7   CHLORIDE mmol/L 104 104 102   CO2 mmol/L 20.5* 25.1 22.6   BUN mg/dL 12 16 12   CREATININE mg/dL 0.66* 0.79 0.88   CALCIUM mg/dL 9.1 8.9 9.5   AST (SGOT) U/L  --  11 11   ALT (SGPT) U/L  --  8 15   ANION GAP mmol/L 11.5 10.9 9.4   ALBUMIN g/dL  --  4.2 4.7                 Results from last 7 days   Lab Units 04/04/24  0332 04/03/24  1600   WBC 10*3/mm3 8.58 9.27   HEMOGLOBIN g/dL 15.2 16.2   HEMATOCRIT % 46.1 48.8   PLATELETS 10*3/mm3 180 170   MCV fL 88.0 90.2   NEUTROPHIL % % 46.6 64.3   LYMPHOCYTE % % 32.1 19.3*   MONOCYTES % % 13.3* 11.4   EOSINOPHIL % % 6.3* 3.7   BASOPHIL % % 1.5 1.0   IMM GRAN % % 0.2 0.3     Results from last 7 days   Lab Units 04/03/24  1600   INR  1.10   APTT seconds 26.9     Results from last 7 days   Lab Units 04/03/24  1600   MAGNESIUM mg/dL 2.1     Results from last 7 days   Lab Units 04/04/24  0332   CHOLESTEROL mg/dL 180   TRIGLYCERIDES mg/dL 154*   HDL CHOL mg/dL 30*         Results from last 7 days   Lab Units 04/04/24  0332   HEMOGLOBIN A1C % 9.70*     Glucose   Date/Time Value Ref Range Status   04/05/2024 0705 203 (H) 70 - 130 mg/dL Final   04/05/2024 0350 254 (H) 70 - 130 mg/dL Final   04/04/2024  2330 349 (H) 70 - 130 mg/dL Final   04/04/2024 2024 188 (H) 70 - 130 mg/dL Final   04/04/2024 1554 271 (H) 70 - 130 mg/dL Final   04/04/2024 1136 225 (H) 70 - 130 mg/dL Final   04/04/2024 0748 197 (H) 70 - 130 mg/dL Final   04/04/2024 0616 204 (H) 70 - 130 mg/dL Final                 Results from last 7 days   Lab Units 04/03/24  1438   COVID19  Not Detected   INFLUENZA B PCR  Not Detected   RSV, PCR  Not Detected               Imaging:   Imaging Results (All)       Procedure Component Value Units Date/Time            I reviewed the patient's new clinical results.  I personally viewed and interpreted the patient's imaging results:        Medication Review:   aspirin, 325 mg, Oral, Daily   Or  aspirin, 300 mg, Rectal, Daily  atorvastatin, 80 mg, Oral, Nightly  insulin lispro, 2-7 Units, Subcutaneous, 4x Daily AC & at Bedtime  losartan, 25 mg, Oral, Q24H  metoprolol succinate XL, 25 mg, Oral, Q24H  sodium chloride, 10 mL, Intravenous, Q12H             ASSESSMENT:   Acute/subacute left cerebellar stroke, left PICA distribution, 4.9 x 2.9 cm  Brain compression with mass effect and compression of the fourth ventricle but no hydrocephalus.   Uncontrolled DM type II, A1c 10.5 on 11/30/2023  HTN  Noncompliance with medical therapy    PLAN:  Cardiology will be evaluated today for possible ANDRA  Aspirin and statin per neurology, MRI showed acute to subacute infarct involving the left cerebellar hemisphere in the left cerebellar tonsil within the left PICA distribution with some petechial hemorrhage without evidence of cuong hemorrhagic transformation with mass effect on the dorsal medulla and the fourth ventricle but no evidence of obstructive hydrocephalus.  Repeat CT scan this morning showed stable finding  Accu-Chek is showing suboptimal control with blood sugar running between 200-3  Hemoglobin A1c is reflecting poorly controlled diabetes at 9.70  White count remains normal, kidney function is normal, the minimal initially  noted hyponatremia has corrected  Since his n.p.o. we will hold on any long-acting insulin but will increase his sliding scale  Patient is okay to transfer out of the ICU once cleared by neurology    Addendum:  Dr. CRUZ called me to update me about her recommendations.  Patient is not actually going to have a ANDRA  He already have a prior workup and we already know that he has a PFO  Cardiology were concerned about the patient compliance with the medication which can be a challenge when on anticoagulation  The prior recommendation were to be on dual antiplatelet therapy but the patient  was very likely not taking his medication as prescribed  Given the above and given the concern about his risk of fall and his overall medical compliance and adherence, the cardiology recommend to continue with the dual antiplatelet therapy and discharged home on the Zio patch.  In case of any atrial fibrillation then reconsidering anticoagulation may be necessary  Appreciate cardiology help on the case, will discuss with neurology    Discussed with patient, patient is new to me, neurology note and cardiology note reviewed  Labs/Notes/films were independently reviewed and pertinent results are summarized above  The copied texts in this note were reviewed and they are accurate as of 04/05/24    Disposition: Stepdown unit once okay with stroke team    Sabina Armstrong MD  04/05/24  09:25 EDT            Dictated utilizing Dragon dictation

## 2024-04-06 LAB
ANION GAP SERPL CALCULATED.3IONS-SCNC: 10.5 MMOL/L (ref 5–15)
ANION GAP SERPL CALCULATED.3IONS-SCNC: 9 MMOL/L (ref 5–15)
BUN SERPL-MCNC: 14 MG/DL (ref 6–20)
BUN SERPL-MCNC: 16 MG/DL (ref 6–20)
BUN/CREAT SERPL: 21.9 (ref 7–25)
BUN/CREAT SERPL: 25.4 (ref 7–25)
CALCIUM SPEC-SCNC: 8.2 MG/DL (ref 8.6–10.5)
CALCIUM SPEC-SCNC: 8.4 MG/DL (ref 8.6–10.5)
CHLORIDE SERPL-SCNC: 110 MMOL/L (ref 98–107)
CHLORIDE SERPL-SCNC: 110 MMOL/L (ref 98–107)
CO2 SERPL-SCNC: 19.5 MMOL/L (ref 22–29)
CO2 SERPL-SCNC: 23 MMOL/L (ref 22–29)
CREAT SERPL-MCNC: 0.63 MG/DL (ref 0.76–1.27)
CREAT SERPL-MCNC: 0.64 MG/DL (ref 0.76–1.27)
EGFRCR SERPLBLD CKD-EPI 2021: 119 ML/MIN/1.73
EGFRCR SERPLBLD CKD-EPI 2021: 119.5 ML/MIN/1.73
GLUCOSE BLDC GLUCOMTR-MCNC: 180 MG/DL (ref 70–130)
GLUCOSE BLDC GLUCOMTR-MCNC: 199 MG/DL (ref 70–130)
GLUCOSE BLDC GLUCOMTR-MCNC: 210 MG/DL (ref 70–130)
GLUCOSE BLDC GLUCOMTR-MCNC: 211 MG/DL (ref 70–130)
GLUCOSE BLDC GLUCOMTR-MCNC: 260 MG/DL (ref 70–130)
GLUCOSE BLDC GLUCOMTR-MCNC: 267 MG/DL (ref 70–130)
GLUCOSE SERPL-MCNC: 183 MG/DL (ref 65–99)
GLUCOSE SERPL-MCNC: 196 MG/DL (ref 65–99)
OSMOLALITY SERPL: 292 MOSM/KG (ref 275–300)
OSMOLALITY SERPL: 297 MOSM/KG (ref 275–300)
OSMOLALITY SERPL: 298 MOSM/KG (ref 275–300)
OSMOLALITY SERPL: 300 MOSM/KG (ref 275–300)
POTASSIUM SERPL-SCNC: 4 MMOL/L (ref 3.5–5.2)
POTASSIUM SERPL-SCNC: 4.1 MMOL/L (ref 3.5–5.2)
SODIUM SERPL-SCNC: 138 MMOL/L (ref 136–145)
SODIUM SERPL-SCNC: 139 MMOL/L (ref 136–145)
SODIUM SERPL-SCNC: 140 MMOL/L (ref 136–145)
SODIUM SERPL-SCNC: 140 MMOL/L (ref 136–145)
SODIUM SERPL-SCNC: 142 MMOL/L (ref 136–145)

## 2024-04-06 PROCEDURE — 83930 ASSAY OF BLOOD OSMOLALITY: CPT | Performed by: STUDENT IN AN ORGANIZED HEALTH CARE EDUCATION/TRAINING PROGRAM

## 2024-04-06 PROCEDURE — 63710000001 INSULIN LISPRO (HUMAN) PER 5 UNITS: Performed by: INTERNAL MEDICINE

## 2024-04-06 PROCEDURE — 84295 ASSAY OF SERUM SODIUM: CPT | Performed by: STUDENT IN AN ORGANIZED HEALTH CARE EDUCATION/TRAINING PROGRAM

## 2024-04-06 PROCEDURE — 99232 SBSQ HOSP IP/OBS MODERATE 35: CPT | Performed by: INTERNAL MEDICINE

## 2024-04-06 PROCEDURE — 25810000003 SODIUM CHLORIDE 3 % SOLUTION: Performed by: STUDENT IN AN ORGANIZED HEALTH CARE EDUCATION/TRAINING PROGRAM

## 2024-04-06 PROCEDURE — 82948 REAGENT STRIP/BLOOD GLUCOSE: CPT

## 2024-04-06 PROCEDURE — 80048 BASIC METABOLIC PNL TOTAL CA: CPT | Performed by: STUDENT IN AN ORGANIZED HEALTH CARE EDUCATION/TRAINING PROGRAM

## 2024-04-06 RX ORDER — ASPIRIN 81 MG/1
81 TABLET ORAL DAILY
Status: DISCONTINUED | OUTPATIENT
Start: 2024-04-07 | End: 2024-04-07 | Stop reason: HOSPADM

## 2024-04-06 RX ORDER — 3% SODIUM CHLORIDE 3 G/100ML
40 INJECTION, SOLUTION INTRAVENOUS CONTINUOUS
Status: DISCONTINUED | OUTPATIENT
Start: 2024-04-06 | End: 2024-04-06 | Stop reason: SDUPTHER

## 2024-04-06 RX ORDER — CLOPIDOGREL BISULFATE 75 MG/1
75 TABLET ORAL DAILY
Status: DISCONTINUED | OUTPATIENT
Start: 2024-04-07 | End: 2024-04-07 | Stop reason: HOSPADM

## 2024-04-06 RX ORDER — 3% SODIUM CHLORIDE 3 G/100ML
40 INJECTION, SOLUTION INTRAVENOUS CONTINUOUS
Status: DISPENSED | OUTPATIENT
Start: 2024-04-06 | End: 2024-04-07

## 2024-04-06 RX ADMIN — Medication 10 ML: at 20:05

## 2024-04-06 RX ADMIN — ATORVASTATIN CALCIUM 80 MG: 80 TABLET, FILM COATED ORAL at 20:05

## 2024-04-06 RX ADMIN — SODIUM CHLORIDE 40 ML/HR: 3 INJECTION, SOLUTION INTRAVENOUS at 03:06

## 2024-04-06 RX ADMIN — METOPROLOL SUCCINATE 25 MG: 25 TABLET, EXTENDED RELEASE ORAL at 07:39

## 2024-04-06 RX ADMIN — ASPIRIN 325 MG: 325 TABLET ORAL at 07:38

## 2024-04-06 RX ADMIN — INSULIN LISPRO 5 UNITS: 100 INJECTION, SOLUTION INTRAVENOUS; SUBCUTANEOUS at 11:35

## 2024-04-06 RX ADMIN — LOSARTAN POTASSIUM 25 MG: 25 TABLET, FILM COATED ORAL at 07:39

## 2024-04-06 RX ADMIN — SODIUM CHLORIDE 40 ML/HR: 3 INJECTION, SOLUTION INTRAVENOUS at 16:36

## 2024-04-06 RX ADMIN — ACETAMINOPHEN 325MG 650 MG: 325 TABLET ORAL at 20:10

## 2024-04-06 RX ADMIN — Medication 10 ML: at 07:40

## 2024-04-06 RX ADMIN — INSULIN LISPRO 3 UNITS: 100 INJECTION, SOLUTION INTRAVENOUS; SUBCUTANEOUS at 07:39

## 2024-04-06 RX ADMIN — INSULIN LISPRO 5 UNITS: 100 INJECTION, SOLUTION INTRAVENOUS; SUBCUTANEOUS at 20:05

## 2024-04-06 RX ADMIN — INSULIN LISPRO 3 UNITS: 100 INJECTION, SOLUTION INTRAVENOUS; SUBCUTANEOUS at 16:40

## 2024-04-06 NOTE — PLAN OF CARE
Goal Outcome Evaluation:   Patient alert and oriented x4, NIH maintains at 0, moves extremities equally at normal power, pupils equal and reactive, speech clear, no numbness, tingling, sensory change, headache. Latest CT is unchanged from previous, no orders for new scan.  Room air, clear breath sounds, SPO2 mid to high 90s.  NSR, frequent PVC ectopy, normotensive, on home antihypertensives, pulses palpable.  Normoactive bowel sounds, CC diet, patient requesting tray outside of [facility], reviewed diabetic education with patient, specialty tray still requested.  Skin intact.  On hypertonic saline, no adverse effects noted, latest sodium level within goal (135<150, latest 140), lab stick. IV site shows no signs of phlebitis.

## 2024-04-06 NOTE — PROGRESS NOTES
"DOS: 2024  NAME: Raghav oLco   : 1980  PCP: Provider, No Known  Chief Complaint   Patient presents with    Hypertension    Headache    Eye Problem       Chief complaint: Dizziness and gait imbalance  Subjective: Patient has been seen and evaluated no acute events overnight.  Repeat CT head looks stable but this is only day 5.  But considering the size of the infarct I would still watch him for a couple of days to see if he develops any obstruction.  Patient refused to stay in the hospital and wants to leave AMA as he has some work to take care of.    Objective:  Vital signs: /72   Pulse 71   Temp 98 °F (36.7 °C) (Oral)   Resp 15   Ht 172.7 cm (68\")   Wt 68.6 kg (151 lb 3.8 oz)   SpO2 93%   BMI 23.00 kg/m²    Gen: NAD, vitals reviewed  MS: oriented x3, normal comprehension, repetition and fluency   CN: visual acuity grossly normal, PERRL, EOMI, no facial droop, no dysarthria  Motor: 5/5 throughout upper and lower extremities, normal tone  Sensory: intact to light touch all 4 ext.    Laboratory results:  Lab Results   Component Value Date    GLUCOSE 196 (H) 2024    CALCIUM 8.2 (L) 2024     2024    K 4.0 2024    CO2 19.5 (L) 2024     (H) 2024    BUN 16 2024    CREATININE 0.63 (L) 2024    EGFRIFAFRI >60 2023    EGFRIFNONA 107 2019    BCR 25.4 (H) 2024    ANIONGAP 10.5 2024     Lab Results   Component Value Date    WBC 8.58 2024    HGB 15.2 2024    HCT 46.1 2024    MCV 88.0 2024     2024     Lab Results   Component Value Date     (H) 2024    LDL 66 2019    LDL 85 2019         Lab 24  0332   HEMOGLOBIN A1C 9.70*        Review of labs:   Sodium 142  Creatinine 0.71  Blood glucose 225    WBC 8.58  Hemoglobin 15 and platelets 180      A1c 9.7  B12 363      Review and interpretation of imaging:      CTA head and neck done on 4/3/2024 " did not show any significant atherosclerosis stenosis or occlusion     CT head done on 4/3/2024 showed left cerebellar infarct     MRI brain done on 4/4/2024 diffusion restriction along the left cerebellar region along with mild petechial changes on SWI no other T2 flair changes or no other strokes appreciated     TTE -  Left ventricular systolic function is moderately decreased. Left ventricular ejection fraction appears to be 31 - 35%.  There is a fixed echodensity in the apex.  Structure may be consistent with hypertrophic trabeculation however cannot exclude thrombus.    The left ventricular cavity is moderately dilated.    Left ventricular diastolic function is consistent with (grade I) impaired relaxation.    Moderate patent foramen ovale present.    Saline test results are positive.     Diagnoses:  Acute left cerebellar stroke with brain compression on hypertonic saline  Hypertension, on losartan and metoprolol  Hyperlipidemia, on Lipitor 80 mg daily  Uncontrolled type 2 diabetes mellitus with A1c of 10.2  Tobacco abuse     1.  Acute left cerebellar stroke  Etiology of the stroke most likely is ESUS, most likely based on TTE findings with low EF  Systolic blood pressure goal can be normalized  Patient was initially on aspirin he was placed on dual antiplatelet therapy with aspirin and Plavix by cardiology  Patient is also on Lipitor 80 mg daily for secondary stroke prevention  Provided bedside counseling about tobacco cessation and medication compliance  He has a repeat 2D echo in 6 months  Repeat CT head this morning looks stable     I am okay with continuing dual antiplatelet therapy given his recent cardiac stents, if his EF continues to remain less than 30% or if he develops any more strokes he will need to go on empiric anticoagulation    Dr. Chavez who reviewed the 2D echo yesterday did not recommend ANDRA and commented on the echogenicity is most likely trabeculation than thrombus.    Repeat CT head looks  stable but this is only day 5.  But considering the size of the infarct I would still watch him for a couple of days to see if he develops any obstruction.  Patient refused to stay in the hospital and wants to leave AMA as he has some work to take care of.    I will still schedule follow-up for him in the stroke clinic in 2 months.    Labs any signs of worsening headache new dizziness or any new neurological symptoms he needs to go to the nearest ER.        MDM   Reviewed: Previous charts, nursing notes and vitals   Reviewed: Previous labs    Interpretation: Labs   Total time providing critical care is :30-74 minutes. This excluded time spent performing separately reportable procedures and services  Consults :Neurology/Stroke    Please note that portions of this note were completed with a voice recognition program.     Kenny Grey MD  Neuro Hospitalist /Vascular Neurology.

## 2024-04-06 NOTE — PROGRESS NOTES
[facility] INPATIENT PROGRESS NOTE         [facility] INTENSIVE CARE    2024      PATIENT IDENTIFICATION:  Name: Raghav Loco ADMIT: 4/3/2024   : 1980  PCP: Provider, No Known    MRN: [id number] LOS: 3 days   AGE/SEX: 43 y.o. male  ROOM: [facility]                     LOS 3    Reason for visit: ICU medical management with acute/subacute left cerebellar stroke      SUBJECTIVE:      Denies shortness of breath, cough, chest pain.  No weakness or vision changes.  He is hoping to be able to go home soon.  Awaiting further neurology input.      Objective   OBJECTIVE:    Vital Sign Min/Max for last 24 hours  Temp  Min: 97.6 °F (36.4 °C)  Max: 98 °F (36.7 °C)   BP  Min: 85/43  Max: 130/81   Pulse  Min: 65  Max: 94   Resp  Min: 14  Max: 15   SpO2  Min: 84 %  Max: 100 %   No data recorded   Weight  Min: 68.6 kg (151 lb 3.8 oz)  Max: 68.6 kg (151 lb 3.8 oz)    Vitals:    24 0659 24 0700 24 0800 24 0900   BP:  116/77 130/81 113/75   BP Location:   Right arm    Patient Position:   Lying    Pulse: 75 77 76 76   Resp:   15    Temp:  98 °F (36.7 °C)     TempSrc:  Oral     SpO2: 94% 94% 95% 94%   Weight:       Height:                24  1339 24  0600 24  0403   Weight: 68 kg (150 lb) 66.8 kg (147 lb 4.3 oz) 68.6 kg (151 lb 3.8 oz)       Body mass index is 23 kg/m².                          Body mass index is 23 kg/m².    Intake/Output Summary (Last 24 hours) at 2024 0933  Last data filed at 2024 0800  Gross per 24 hour   Intake 1070.81 ml   Output 1000 ml   Net 70.81 ml         Exam:  GEN:  No distress, appears stated age  EYES:   PERRL, anicteric sclerae  ENT:    External ears/nose normal, OP clear  NECK:  No adenopathy, midline trachea  LUNGS: Normal chest on inspection, palpation and auscultation  CV:  Normal S1S2, without murmur  ABD:  Nontender, nondistended, no hepatosplenomegaly, +BS  EXT:  No edema.  No cyanosis or clubbing.  No mottling and  normal cap refill.    Assessment     Scheduled meds:  aspirin, 325 mg, Oral, Daily   Or  aspirin, 300 mg, Rectal, Daily  atorvastatin, 80 mg, Oral, Nightly  insulin lispro, 3-14 Units, Subcutaneous, 4x Daily AC & at Bedtime  losartan, 25 mg, Oral, Q24H  metoprolol succinate XL, 25 mg, Oral, Q24H  sodium chloride, 10 mL, Intravenous, Q12H      IV meds:                      sodium chloride, 40 mL/hr, Last Rate: 40 mL/hr (04/06/24 0306)      Data Review:  Results from last 7 days   Lab Units 04/06/24  0615 04/06/24  0001 04/05/24 2006 04/05/24  1804 04/05/24  1153 04/05/24  0619 04/04/24  0332   SODIUM mmol/L 140 138 139 138 135*  137 136 140   POTASSIUM mmol/L 4.0  --  4.2  --  4.1 4.2 3.6   CHLORIDE mmol/L 110*  --  106  --  103 104 104   CO2 mmol/L 19.5*  --  24.0  --  21.9* 20.5* 25.1   BUN mg/dL 16  --  17  --  13 12 16   CREATININE mg/dL 0.63*  --  0.70*  --  0.71* 0.66* 0.79   GLUCOSE mg/dL 196*  --  214*  --  189* 243* 236*   CALCIUM mg/dL 8.2*  --  8.9  --  8.8 9.1 8.9         Estimated Creatinine Clearance: 143.7 mL/min (A) (by C-G formula based on SCr of 0.63 mg/dL (L)).  Results from last 7 days   Lab Units 04/04/24  0332 04/03/24  1600   WBC 10*3/mm3 8.58 9.27   HEMOGLOBIN g/dL 15.2 16.2   PLATELETS 10*3/mm3 180 170     Results from last 7 days   Lab Units 04/03/24  1600   INR  1.10     Results from last 7 days   Lab Units 04/04/24  0332 04/03/24  1600   ALT (SGPT) U/L 8 15   AST (SGOT) U/L 11 11                 Hemoglobin A1C   Date/Time Value Ref Range Status   04/04/2024 0332 9.70 (H) 4.80 - 5.60 % Final     Glucose   Date/Time Value Ref Range Status   04/06/2024 0724 180 (H) 70 - 130 mg/dL Final   04/06/2024 0035 267 (H) 70 - 130 mg/dL Final   04/05/2024 2020 198 (H) 70 - 130 mg/dL Final   04/05/2024 1620 145 (H) 70 - 130 mg/dL Final   04/05/2024 1046 179 (H) 70 - 130 mg/dL Final   04/05/2024 0705 203 (H) 70 - 130 mg/dL Final   04/05/2024 0350 254 (H) 70 - 130 mg/dL Final         Imaging  reviewed  Repeat CT head 4/5 reviewed    Microbiology reviewed  RVP negative          Active Hospital Problems    Diagnosis  POA    **Acute CVA (cerebrovascular accident) [I63.9]  Yes      Resolved Hospital Problems   No resolved problems to display.         ASSESSMENT:  Acute/subacute left cerebellar stroke, left PICA distribution, 4.9 x 2.9 cm  Brain compression with mass effect and compression of the fourth ventricle but no hydrocephalus.   Uncontrolled DM type II, A1c 10.5 on 11/30/2023  HTN  Noncompliance with medical therapy      PLAN:  Neurochecks per protocol.  Awaiting further input from neuro team.  Control blood pressure.  Control glucose.  Aspirin and statin for secondary stroke prevention.  Mechanical DVT prophylaxis.      Naren Connell MD  Pulmonary and Critical Care Medicine  [facility], [facility]  4/6/2024    09:33 EDT

## 2024-04-06 NOTE — PROGRESS NOTES
[facility]  Progress note: 2024    Patient Identification:  Name:Raghav Loco  Age:43 y.o.  Sex: male  :  1980  MRN: [id number]           CC:  Cardiomyopathy, stroke, coronary disease    Interval history:  Blood pressure slightly low, frequent PVCs noted on telemetry.  He is awake alert very anxious to go home.    Vital Signs:   Temp:  [97.6 °F (36.4 °C)-98 °F (36.7 °C)] 98 °F (36.7 °C)  Heart Rate:  [65-94] 70  Resp:  [14-15] 15  BP: ()/(43-88) 101/63    Intake/Output Summary (Last 24 hours) at 2024 1128  Last data filed at 2024 1000  Gross per 24 hour   Intake 1070.81 ml   Output 1200 ml   Net -129.19 ml       Physical Examination:    General Appearance No acute distress   Neck No adenopathy, supple, trachea midline, no thyromegaly, no carotid bruit, no JVD   Lungs Clear to auscultation,respirations regular, even and unlabored   Heart Regular rhythm and normal rate, normal S1 and S2, no murmur, no gallop, no rub, no click   Chest wall No abnormalities observed   Abdomen Normal bowel sounds, no masses, no hepatomegaly, soft   Extremities Moves all extremities well, no edema, no cyanosis, no redness   Neurological Alert and oriented x 3     Lab Review:  Personally reviewed the labs, radiology imaging and other cardiac procedures.   Results from last 7 days   Lab Units 24  0615 24  0619 24  0332   SODIUM mmol/L 140   < > 140   POTASSIUM mmol/L 4.0   < > 3.6   CHLORIDE mmol/L 110*   < > 104   CO2 mmol/L 19.5*   < > 25.1   BUN mg/dL 16   < > 16   CREATININE mg/dL 0.63*   < > 0.79   CALCIUM mg/dL 8.2*   < > 8.9   BILIRUBIN mg/dL  --   --  0.3   ALK PHOS U/L  --   --  60   ALT (SGPT) U/L  --   --  8   AST (SGOT) U/L  --   --  11   GLUCOSE mg/dL 196*   < > 236*    < > = values in this interval not displayed.         Results from last 7 days   Lab Units 24  0332 24  1600   WBC 10*3/mm3 8.58 9.27   HEMOGLOBIN g/dL 15.2 16.2   HEMATOCRIT % 46.1 48.8    PLATELETS 10*3/mm3 180 170     Results from last 7 days   Lab Units 04/03/24  1600   INR  1.10   APTT seconds 26.9     Medication Review:   Meds reviewed  Scheduled Meds:aspirin, 325 mg, Oral, Daily   Or  aspirin, 300 mg, Rectal, Daily  atorvastatin, 80 mg, Oral, Nightly  insulin lispro, 3-14 Units, Subcutaneous, 4x Daily AC & at Bedtime  losartan, 25 mg, Oral, Q24H  metoprolol succinate XL, 25 mg, Oral, Q24H  sodium chloride, 10 mL, Intravenous, Q12H      Continuous Infusions:sodium chloride, 40 mL/hr, Last Rate: Stopped (04/06/24 1044)  sodium chloride, 40 mL/hr, Last Rate: 40 mL/hr (04/06/24 1044)      I personally viewed and interpreted the patient's EKG/Telemetry data    Assessment and Plan  1.  Acute stroke, felt to likely be embolic with cerebral edema.  Given presence of intra-atrial shunting with lack of clear atrial arrhythmia/atrial fibrillation and patient's history of noncompliance it was felt that dual antiplatelet therapy would be a safer route than anticoagulation.  ANDRA therefore not additive at this point.  Currently enteric-coated aspirin 325mg a day which she received today.  Will switch to 81 mg of aspirin and Plavix 75 mg starting tomorrow.  2.  Severe cardiomyopathy.  He is on aspirin, losartan, metoprolol.  Hopefully blood pressure will tolerate this.  Will observe.  4.  Coronary artery disease with prior stent to the LAD with multiple MRI. On asprin now but has not been taking this either as an outpatient..  No anginal symptoms at all.  5.  Hyperlipidemia.  On statin therapy at this time.  6.  Diabetes  7.  Nicotine use       Mini Chavez  4/6/202411:28 EDT  45min spent in reviewing records, discussion and examination of the patient and discussion with other members of the patient's medical team.     Dictated utilizing Dragon dictation

## 2024-04-06 NOTE — PLAN OF CARE
Goal Outcome Evaluation:         Pt had L pupil change on previous shift before me, pupils back to normal during my shift with no other neuro changes, no acute events this shift, VSS, pt still on 3% saline running at 40mL/hr. Pt AxOx4, wife at bedside

## 2024-04-07 ENCOUNTER — APPOINTMENT (OUTPATIENT)
Dept: CT IMAGING | Facility: HOSPITAL | Age: 44
DRG: 064 | End: 2024-04-07
Payer: COMMERCIAL

## 2024-04-07 VITALS
OXYGEN SATURATION: 95 % | HEIGHT: 68 IN | BODY MASS INDEX: 22.19 KG/M2 | WEIGHT: 146.39 LBS | HEART RATE: 73 BPM | SYSTOLIC BLOOD PRESSURE: 116 MMHG | TEMPERATURE: 97.5 F | DIASTOLIC BLOOD PRESSURE: 77 MMHG | RESPIRATION RATE: 18 BRPM

## 2024-04-07 LAB
ANION GAP SERPL CALCULATED.3IONS-SCNC: 9 MMOL/L (ref 5–15)
BUN SERPL-MCNC: 13 MG/DL (ref 6–20)
BUN/CREAT SERPL: 18.3 (ref 7–25)
CALCIUM SPEC-SCNC: 8.6 MG/DL (ref 8.6–10.5)
CHLORIDE SERPL-SCNC: 110 MMOL/L (ref 98–107)
CO2 SERPL-SCNC: 23 MMOL/L (ref 22–29)
CREAT SERPL-MCNC: 0.71 MG/DL (ref 0.76–1.27)
EGFRCR SERPLBLD CKD-EPI 2021: 115.3 ML/MIN/1.73
GLUCOSE BLDC GLUCOMTR-MCNC: 193 MG/DL (ref 70–130)
GLUCOSE SERPL-MCNC: 225 MG/DL (ref 65–99)
OSMOLALITY SERPL: 301 MOSM/KG (ref 275–300)
OSMOLALITY SERPL: 310 MOSM/KG (ref 275–300)
POTASSIUM SERPL-SCNC: 4.4 MMOL/L (ref 3.5–5.2)
SODIUM SERPL-SCNC: 142 MMOL/L (ref 136–145)
SODIUM SERPL-SCNC: 144 MMOL/L (ref 136–145)

## 2024-04-07 PROCEDURE — 99232 SBSQ HOSP IP/OBS MODERATE 35: CPT | Performed by: STUDENT IN AN ORGANIZED HEALTH CARE EDUCATION/TRAINING PROGRAM

## 2024-04-07 PROCEDURE — 83930 ASSAY OF BLOOD OSMOLALITY: CPT | Performed by: STUDENT IN AN ORGANIZED HEALTH CARE EDUCATION/TRAINING PROGRAM

## 2024-04-07 PROCEDURE — 80048 BASIC METABOLIC PNL TOTAL CA: CPT | Performed by: STUDENT IN AN ORGANIZED HEALTH CARE EDUCATION/TRAINING PROGRAM

## 2024-04-07 PROCEDURE — 63710000001 INSULIN LISPRO (HUMAN) PER 5 UNITS: Performed by: INTERNAL MEDICINE

## 2024-04-07 PROCEDURE — 84295 ASSAY OF SERUM SODIUM: CPT | Performed by: STUDENT IN AN ORGANIZED HEALTH CARE EDUCATION/TRAINING PROGRAM

## 2024-04-07 PROCEDURE — 25810000003 SODIUM CHLORIDE 3 % SOLUTION: Performed by: STUDENT IN AN ORGANIZED HEALTH CARE EDUCATION/TRAINING PROGRAM

## 2024-04-07 PROCEDURE — 70450 CT HEAD/BRAIN W/O DYE: CPT

## 2024-04-07 PROCEDURE — 82948 REAGENT STRIP/BLOOD GLUCOSE: CPT

## 2024-04-07 PROCEDURE — 99232 SBSQ HOSP IP/OBS MODERATE 35: CPT | Performed by: NURSE PRACTITIONER

## 2024-04-07 RX ADMIN — METOPROLOL SUCCINATE 25 MG: 25 TABLET, EXTENDED RELEASE ORAL at 09:07

## 2024-04-07 RX ADMIN — SODIUM CHLORIDE 40 ML/HR: 3 INJECTION, SOLUTION INTRAVENOUS at 03:53

## 2024-04-07 RX ADMIN — INSULIN LISPRO 3 UNITS: 100 INJECTION, SOLUTION INTRAVENOUS; SUBCUTANEOUS at 09:07

## 2024-04-07 RX ADMIN — LOSARTAN POTASSIUM 25 MG: 25 TABLET, FILM COATED ORAL at 09:07

## 2024-04-07 RX ADMIN — ASPIRIN 81 MG: 81 TABLET, COATED ORAL at 09:07

## 2024-04-07 RX ADMIN — CLOPIDOGREL BISULFATE 75 MG: 75 TABLET, FILM COATED ORAL at 09:07

## 2024-04-07 RX ADMIN — Medication 10 ML: at 09:07

## 2024-04-07 NOTE — PLAN OF CARE
Problem: Adult Inpatient Plan of Care  Goal: Plan of Care Review  Outcome: Adequate for Care Transition  Goal: Patient-Specific Goal (Individualized)  Outcome: Adequate for Care Transition  Goal: Absence of Hospital-Acquired Illness or Injury  Outcome: Adequate for Care Transition  Intervention: Identify and Manage Fall Risk  Recent Flowsheet Documentation  Taken 4/7/2024 1100 by Shahid French RN  Safety Promotion/Fall Prevention:   safety round/check completed   room organization consistent   fall prevention program maintained   clutter free environment maintained   activity supervised  Taken 4/7/2024 1000 by Shahid French RN  Safety Promotion/Fall Prevention:   safety round/check completed   room organization consistent   fall prevention program maintained   clutter free environment maintained   activity supervised  Taken 4/7/2024 0900 by Shahid French RN  Safety Promotion/Fall Prevention:   safety round/check completed   room organization consistent   fall prevention program maintained   clutter free environment maintained   activity supervised  Taken 4/7/2024 0800 by Shahid French RN  Safety Promotion/Fall Prevention:   safety round/check completed   room organization consistent   fall prevention program maintained   clutter free environment maintained   activity supervised  Taken 4/7/2024 0700 by Shahid French RN  Safety Promotion/Fall Prevention:   safety round/check completed   room organization consistent   fall prevention program maintained   clutter free environment maintained   activity supervised  Intervention: Prevent Skin Injury  Recent Flowsheet Documentation  Taken 4/7/2024 1000 by Shahid French RN  Body Position: position changed independently  Taken 4/7/2024 0800 by Shahid French RN  Body Position: position changed independently  Intervention: Prevent and Manage VTE (Venous Thromboembolism) Risk  Recent Flowsheet Documentation  Taken 4/7/2024 0800 by Shahid French  RN  Range of Motion: active ROM (range of motion) encouraged  Intervention: Prevent Infection  Recent Flowsheet Documentation  Taken 4/7/2024 1100 by Shahid French RN  Infection Prevention:   visitors restricted/screened   single patient room provided   rest/sleep promoted   personal protective equipment utilized   hand hygiene promoted   environmental surveillance performed  Taken 4/7/2024 1000 by Shahid French RN  Infection Prevention:   visitors restricted/screened   single patient room provided   rest/sleep promoted   personal protective equipment utilized   hand hygiene promoted   environmental surveillance performed  Taken 4/7/2024 0900 by Shahid French RN  Infection Prevention:   visitors restricted/screened   single patient room provided   rest/sleep promoted   personal protective equipment utilized   hand hygiene promoted   environmental surveillance performed  Taken 4/7/2024 0800 by Shahid French RN  Infection Prevention:   visitors restricted/screened   single patient room provided   rest/sleep promoted   personal protective equipment utilized   hand hygiene promoted   environmental surveillance performed  Taken 4/7/2024 0700 by Shahid French RN  Infection Prevention:   visitors restricted/screened   single patient room provided   rest/sleep promoted   personal protective equipment utilized   hand hygiene promoted   environmental surveillance performed  Goal: Optimal Comfort and Wellbeing  Outcome: Adequate for Care Transition  Intervention: Provide Person-Centered Care  Recent Flowsheet Documentation  Taken 4/7/2024 0800 by Shahid French RN  Trust Relationship/Rapport:   care explained   reassurance provided   thoughts/feelings acknowledged  Goal: Readiness for Transition of Care  Outcome: Adequate for Care Transition     Problem: Adjustment to Illness (Stroke, Ischemic/Transient Ischemic Attack)  Goal: Optimal Coping  Outcome: Adequate for Care Transition  Intervention: Support  Psychosocial Response to Stroke  Recent Flowsheet Documentation  Taken 4/7/2024 0800 by Shahid French RN  Family/Support System Care: support provided     Problem: Bowel Elimination Impaired (Stroke, Ischemic/Transient Ischemic Attack)  Goal: Effective Bowel Elimination  Outcome: Adequate for Care Transition     Problem: Cerebral Tissue Perfusion (Stroke, Ischemic/Transient Ischemic Attack)  Goal: Optimal Cerebral Tissue Perfusion  Outcome: Adequate for Care Transition  Intervention: Protect and Optimize Cerebral Perfusion  Recent Flowsheet Documentation  Taken 4/7/2024 0800 by Shahid French RN  Cerebral Perfusion Promotion: blood pressure monitored     Problem: Cognitive Impairment (Stroke, Ischemic/Transient Ischemic Attack)  Goal: Optimal Cognitive Function  Outcome: Adequate for Care Transition     Problem: Communication Impairment (Stroke, Ischemic/Transient Ischemic Attack)  Goal: Improved Communication Skills  Outcome: Adequate for Care Transition     Problem: Functional Ability Impaired (Stroke, Ischemic/Transient Ischemic Attack)  Goal: Optimal Functional Ability  Outcome: Adequate for Care Transition     Problem: Respiratory Compromise (Stroke, Ischemic/Transient Ischemic Attack)  Goal: Effective Oxygenation and Ventilation  Outcome: Adequate for Care Transition  Intervention: Optimize Oxygenation and Ventilation  Recent Flowsheet Documentation  Taken 4/7/2024 1000 by Shahid French RN  Head of Bed (HOB) Positioning: HOB at 30 degrees  Taken 4/7/2024 0800 by Shahid French RN  Head of Bed (HOB) Positioning: HOB at 30 degrees     Problem: Swallowing Impairment (Stroke, Ischemic/Transient Ischemic Attack)  Goal: Optimal Eating and Swallowing without Aspiration  Outcome: Adequate for Care Transition     Problem: Urinary Elimination Impaired (Stroke, Ischemic/Transient Ischemic Attack)  Goal: Effective Urinary Elimination  Outcome: Adequate for Care Transition   Goal Outcome Evaluation:

## 2024-04-07 NOTE — PROGRESS NOTES
[facility]  Progress note: 2024    Patient Identification:  Name:Raghav Loco  Age:43 y.o.  Sex: male  :  1980  MRN: [id number]           CC:  Cardiomyopathy, stroke, coronary disease    Interval history:  Upset with neurology decision to keep overnight. Leaving AMA.  Discussed risks of leaving with edema with mass effect. He voices understanding but chooses to leave.    Vital Signs:   Temp:  [97.5 °F (36.4 °C)-98 °F (36.7 °C)] 97.5 °F (36.4 °C)  Heart Rate:  [66-84] 73  Resp:  [16-18] 18  BP: (100-121)/(62-87) 116/77    Intake/Output Summary (Last 24 hours) at 2024 0935  Last data filed at 2024 0300  Gross per 24 hour   Intake 822 ml   Output 1950 ml   Net -1128 ml       Physical Examination:    General Appearance No acute distress   Neck No adenopathy, supple, trachea midline, no thyromegaly, no carotid bruit, no JVD   Lungs Clear to auscultation,respirations regular, even and unlabored   Heart Regular rhythm and normal rate, normal S1 and S2, no murmur, no gallop, no rub, no click   Chest wall No abnormalities observed   Abdomen Normal bowel sounds, no masses, no hepatomegaly, soft   Extremities Moves all extremities well, no edema, no cyanosis, no redness   Neurological Alert and oriented x 3     Lab Review:  Personally reviewed the labs, radiology imaging and other cardiac procedures.   Results from last 7 days   Lab Units 24  0622 24  0619 24  0332   SODIUM mmol/L 142   < > 140   POTASSIUM mmol/L 4.4   < > 3.6   CHLORIDE mmol/L 110*   < > 104   CO2 mmol/L 23.0   < > 25.1   BUN mg/dL 13   < > 16   CREATININE mg/dL 0.71*   < > 0.79   CALCIUM mg/dL 8.6   < > 8.9   BILIRUBIN mg/dL  --   --  0.3   ALK PHOS U/L  --   --  60   ALT (SGPT) U/L  --   --  8   AST (SGOT) U/L  --   --  11   GLUCOSE mg/dL 225*   < > 236*    < > = values in this interval not displayed.         Results from last 7 days   Lab Units 24  0332 24  1600   WBC 10*3/mm3 8.58 9.27    HEMOGLOBIN g/dL 15.2 16.2   HEMATOCRIT % 46.1 48.8   PLATELETS 10*3/mm3 180 170     Results from last 7 days   Lab Units 04/03/24  1600   INR  1.10   APTT seconds 26.9     Medication Review:   Meds reviewed  Scheduled Meds:aspirin, 81 mg, Oral, Daily  atorvastatin, 80 mg, Oral, Nightly  clopidogrel, 75 mg, Oral, Daily  insulin lispro, 3-14 Units, Subcutaneous, 4x Daily AC & at Bedtime  losartan, 25 mg, Oral, Q24H  metoprolol succinate XL, 25 mg, Oral, Q24H  sodium chloride, 10 mL, Intravenous, Q12H      Continuous Infusions:sodium chloride, 40 mL/hr, Last Rate: 40 mL/hr (04/07/24 3701)      I personally viewed and interpreted the patient's EKG/Telemetry data    Assessment and Plan  1.  Acute stroke, felt to likely be embolic with cerebral edema.  Given presence of intra-atrial shunting with lack of clear atrial arrhythmia/atrial fibrillation and patient's history of noncompliance it was felt that dual antiplatelet therapy would be a safer route than anticoagulation.  ANDRA therefore not additive at this point.  Currently enteric-coated aspirin 325mg a day which she received today.  Will switch to 81 mg of aspirin and Plavix 75 mg today.  2.  Severe cardiomyopathy.  He is on aspirin, losartan, metoprolol.  Hopefully blood pressure will tolerate this.    4.  Coronary artery disease with prior stent to the LAD with multiple MRI. On asprin now but has not been taking this either as an outpatient..  No anginal symptoms at all.  5.  Hyperlipidemia.  On statin therapy at this time.  6.  Diabetes  7.  Nicotine use  8.  PVCs on monitor - none noted today.    Mr. Loco is a patient with cardiomyopathy and history of stent to the LAD.  He has new stroke with cerebral edema; mass effect still noted on CT head today.  He has been advised to stay but he insist on leaving AMA.    Recommend he continue 81 mg aspirin daily, 80 mg atorvastatin nightly, 75 mg Plavix daily, 25 mg losartan daily, and 25 mg metoprolol succinate.       He should be discharged home with Zio patch monitor but I doubt he will wait for this.    I will help with the office call to arrange follow-up.    Thanks!  MALATHI Holland

## 2024-04-07 NOTE — NURSING NOTE
Patient is choosing to leave AMA.  Providers notified and patient spoke with neurology at bedside.  Neuro explained the risks of increased brain swelling and potential complications.  The patient states he understands this and will return to the hospital f he starts to feel bad.  Cardiology and pulmonology are aware of this too.  Patient is A&Ox4 and is competent to make decisions.  AMA paperwork signed and patient leaving now.

## 2024-04-07 NOTE — PLAN OF CARE
Problem: Adult Inpatient Plan of Care  Goal: Plan of Care Review  Outcome: Ongoing, Not Progressing  Flowsheets  Taken 4/6/2024 2102 by Maria Victoria Zarate RN  Progress: improving  Taken 4/4/2024 1308 by Vonda Gould PT  Plan of Care Reviewed With:   patient   significant other   Goal Outcome Evaluation:           Progress: improving       Pt a&o x4, follows commands x4, pupils 3B, equal. Vital signs stable. Na stable. AM head CT shows no change. Adequate urine output overnight. 3% NS continued @40 overnight. Needs assessed and met throughout shift.

## 2024-04-07 NOTE — PROGRESS NOTES
[facility] INPATIENT PROGRESS NOTE         [facility] INTENSIVE CARE    2024      PATIENT IDENTIFICATION:  Name: Raghav Loco ADMIT: 4/3/2024   : 1980  PCP: Provider, No Known    MRN: [id number] LOS: 4 days   AGE/SEX: 43 y.o. male  ROOM: [facility]                     LOS 4    Reason for visit: ICU medical management with acute/subacute left cerebellar stroke      SUBJECTIVE:      No new issues overnight.  Awaiting further neuro input.  Possibly home soon.      Objective   OBJECTIVE:    Vital Sign Min/Max for last 24 hours  Temp  Min: 97.5 °F (36.4 °C)  Max: 98 °F (36.7 °C)   BP  Min: 100/64  Max: 121/83   Pulse  Min: 66  Max: 84   Resp  Min: 16  Max: 18   SpO2  Min: 93 %  Max: 97 %   No data recorded   Weight  Min: 66.4 kg (146 lb 6.2 oz)  Max: 66.4 kg (146 lb 6.2 oz)    Vitals:    24 0400 24 0500 24 0600 24 0700   BP: 109/71 121/83 116/77    BP Location: Right arm      Patient Position: Lying      Pulse: 72 74 73    Resp: 18      Temp: 97.7 °F (36.5 °C)   97.5 °F (36.4 °C)   TempSrc:    Oral   SpO2: 95% 97% 95%    Weight:   66.4 kg (146 lb 6.2 oz)    Height:                24  0600 24  0403 24  0600   Weight: 66.8 kg (147 lb 4.3 oz) 68.6 kg (151 lb 3.8 oz) 66.4 kg (146 lb 6.2 oz)       Body mass index is 22.26 kg/m².                          Body mass index is 22.26 kg/m².    Intake/Output Summary (Last 24 hours) at 2024 0951  Last data filed at 2024 0300  Gross per 24 hour   Intake 822 ml   Output 1950 ml   Net -1128 ml         Exam:  GEN:  No distress, appears stated age  EYES:   PERRL, anicteric sclerae  ENT:    External ears/nose normal, OP clear  NECK:  No adenopathy, midline trachea  LUNGS: Normal chest on inspection, palpation and auscultation  CV:  Normal S1S2, without murmur  ABD:  Nontender, nondistended, no hepatosplenomegaly, +BS  EXT:  No edema.  No cyanosis or clubbing.  No mottling and normal cap refill.    Assessment      Scheduled meds:  aspirin, 81 mg, Oral, Daily  atorvastatin, 80 mg, Oral, Nightly  clopidogrel, 75 mg, Oral, Daily  insulin lispro, 3-14 Units, Subcutaneous, 4x Daily AC & at Bedtime  losartan, 25 mg, Oral, Q24H  metoprolol succinate XL, 25 mg, Oral, Q24H  sodium chloride, 10 mL, Intravenous, Q12H      IV meds:                      sodium chloride, 40 mL/hr, Last Rate: 40 mL/hr (04/07/24 0353)      Data Review:  Results from last 7 days   Lab Units 04/07/24  0622 04/07/24  0015 04/06/24 2006 04/06/24  1821 04/06/24  1151 04/06/24  0615 04/06/24  0001 04/05/24 2006 04/05/24  1804 04/05/24  1153   SODIUM mmol/L 142 144 142 140 139 140   < > 139   < > 135*  137   POTASSIUM mmol/L 4.4  --  4.1  --   --  4.0  --  4.2  --  4.1   CHLORIDE mmol/L 110*  --  110*  --   --  110*  --  106  --  103   CO2 mmol/L 23.0  --  23.0  --   --  19.5*  --  24.0  --  21.9*   BUN mg/dL 13  --  14  --   --  16  --  17  --  13   CREATININE mg/dL 0.71*  --  0.64*  --   --  0.63*  --  0.70*  --  0.71*   GLUCOSE mg/dL 225*  --  183*  --   --  196*  --  214*  --  189*   CALCIUM mg/dL 8.6  --  8.4*  --   --  8.2*  --  8.9  --  8.8    < > = values in this interval not displayed.         Estimated Creatinine Clearance: 123.4 mL/min (A) (by C-G formula based on SCr of 0.71 mg/dL (L)).  Results from last 7 days   Lab Units 04/04/24  0332 04/03/24  1600   WBC 10*3/mm3 8.58 9.27   HEMOGLOBIN g/dL 15.2 16.2   PLATELETS 10*3/mm3 180 170     Results from last 7 days   Lab Units 04/03/24  1600   INR  1.10     Results from last 7 days   Lab Units 04/04/24  0332 04/03/24  1600   ALT (SGPT) U/L 8 15   AST (SGOT) U/L 11 11                 Glucose   Date/Time Value Ref Range Status   04/07/2024 0750 193 (H) 70 - 130 mg/dL Final   04/06/2024 2345 260 (H) 70 - 130 mg/dL Final   04/06/2024 1954 210 (H) 70 - 130 mg/dL Final   04/06/2024 1619 199 (H) 70 - 130 mg/dL Final   04/06/2024 1107 211 (H) 70 - 130 mg/dL Final   04/06/2024 0724 180 (H) 70 - 130 mg/dL  Final   04/06/2024 0035 267 (H) 70 - 130 mg/dL Final         Imaging reviewed  Repeat CT head 4/7 reviewed: Unchanged from previous.    Microbiology reviewed  RVP negative          Active Hospital Problems    Diagnosis  POA    **Acute CVA (cerebrovascular accident) [I63.9]  Yes      Resolved Hospital Problems   No resolved problems to display.         ASSESSMENT:  Acute/subacute left cerebellar stroke, left PICA distribution, 4.9 x 2.9 cm  Brain compression with mass effect and compression of the fourth ventricle but no hydrocephalus.   Uncontrolled DM type II, A1c 10.5 on 11/30/2023  HTN  Noncompliance with medical therapy      PLAN:  Neurochecks per protocol.  Awaiting further input from neuro team.  Stroke felt to be embolic.  Cardiology felt ANDRA not required.  Control blood pressure.  Control glucose.  Aspirin and statin for secondary stroke prevention.  Mechanical DVT prophylaxis.  Of intensive care when okay with neurology service.      Naren Connell MD  Pulmonary and Critical Care Medicine  [facility], [facility]  4/7/2024    09:51 EDT

## 2024-04-07 NOTE — PROGRESS NOTES
Enter Query Response Below      Query Response: Hyponatremia, clinically insignificant.             If applicable, please update the problem list.     Patient: Raghav Loco        : 1980  Account: [id number]           Admit Date:         How to Respond to this query:       a. Click New Note     b. Answer query within the yellow box.                c. Update the Problem List, if applicable.      If you have any questions about this query contact me at: [email or web address]     :     43 year old male admitted with dizziness, cerebral infarction.  History of diabetes mellitus type 2 and noncompliance with medical therapy.  Clinical indicators: 4/3 Serum sodium 134, glucose 259.  4/3 Neurology physical exam-oriented x3, recent/remote memory intact, normal attention/concentration, language intact, no neglect, visual acuity grossly normal, visual fields full, PERRL, facial sensation equal, no facial droop, hearing symmetric, palate elevates symmetrically, shoulder shrug equal, tongue midline.  4/3 Neurology assessment includes hyponatremia.  Treatments:  insulin sliding scale, iv hypertonic sodium chloride.     Please clarify the following:    Hyponatremia- clinically significant  Hyponatremia- clinically insignificant  Other- specify______  Unable to determine      By submitting this query, we are merely seeking further clarification of documentation to accurately reflect all conditions that you are monitoring, evaluating, treating or that extend the hospitalization or utilize additional resources of care. Please utilize your independent clinical judgment when addressing the question(s) above.     This query and your response, once completed, will be entered into the legal medical record.    Sincerely,  Mandi Ceja RN  Clinical Documentation Integrity Program

## 2024-04-07 NOTE — PAYOR COMM NOTE
"Raghav Cruz (43 y.o. Male)                            ATTENTION; DISCHARGE CASE REF #[id number]                           REPLY TO [facility] DEPT  [phone]   DULCE TAMAYO LAURA           Date of Birth   1980    Social Security Number       Address   [address] [place], [place] [address]    Home Phone   [phone]    MRN   [id number]       [employer]   [employer]    Marital Status   Single                            Admission Date   4/3/24    Admission Type   Emergency    Admitting Provider   Lavelle Mack MD    Attending Provider       Department, Room/Bed   [facility] INTENSIVE CARE, I380/1       Discharge Date   4/7/2024    Discharge Disposition   Left Against Medical Advice    Discharge Destination                                 Attending Provider: (none)   Allergies: No Known Allergies    Isolation: None   Infection: None   Code Status: Prior    Ht: 172.7 cm (68\")   Wt: 66.4 kg (146 lb 6.2 oz)    Admission Cmt: None   Principal Problem: Acute CVA (cerebrovascular accident) [I63.9]                   Active Insurance as of 4/3/2024       Primary Coverage       Payor Plan Insurance Group Employer/Plan Group    [facility] [employer] [facility] [facility]        Payor Plan Address Payor Plan Phone Number Payor Plan Fax Number Effective Dates    [address]   4/1/2019 - None Entered    [facility] [id number]         Subscriber Name Subscriber Birth Date Member ID       RAGHAV CRUZ 1980 [id number]                     Emergency Contacts        (Rel.) Home Phone Work Phone Mobile Phone    DARLINE CRUZ (Brother) -- -- [phone]    Mary Cedeno (Significant Other) -- -- [phone]                     Shahid French, RN   Registered Nurse  Nursing     Nursing Note     Signed     Date of Service: 04/07/24 1143  Creation Time: 04/07/24 1143     Signed         Patient is choosing to leave AMA.  Providers notified and patient spoke with " neurology at bedside.  Neuro explained the risks of increased brain swelling and potential complications.  The patient states he understands this and will return to the hospital f he starts to feel bad.  Cardiology and pulmonology are aware of this too.  Patient is A&Ox4 and is competent to make decisions.  AMA paperwork signed and patient leaving now.

## 2024-04-08 RX ORDER — ATORVASTATIN CALCIUM 80 MG/1
80 TABLET, FILM COATED ORAL DAILY
Qty: 90 TABLET | Refills: 3 | Status: SHIPPED | OUTPATIENT
Start: 2024-04-08

## 2024-04-08 RX ORDER — ASPIRIN 81 MG/1
81 TABLET ORAL DAILY
Qty: 90 TABLET | Refills: 3 | Status: SHIPPED | OUTPATIENT
Start: 2024-04-08

## 2024-04-08 RX ORDER — METOPROLOL SUCCINATE 25 MG/1
25 TABLET, EXTENDED RELEASE ORAL DAILY
Qty: 90 TABLET | Refills: 3 | Status: SHIPPED | OUTPATIENT
Start: 2024-04-08

## 2024-04-08 RX ORDER — CLOPIDOGREL BISULFATE 75 MG/1
75 TABLET ORAL DAILY
Qty: 90 TABLET | Refills: 3 | Status: SHIPPED | OUTPATIENT
Start: 2024-04-08

## 2024-04-08 RX ORDER — LOSARTAN POTASSIUM 25 MG/1
25 TABLET ORAL DAILY
Qty: 90 TABLET | Refills: 3 | Status: SHIPPED | OUTPATIENT
Start: 2024-04-08

## 2024-04-10 NOTE — CASE MANAGEMENT/SOCIAL WORK
Case Management Discharge Note      Final Note: home no needs         Selected Continued Care - Discharged on 4/7/2024 Admission date: 4/3/2024 - Discharge disposition: Left Against Medical Advice      Destination    No services have been selected for the patient.                Durable Medical Equipment    No services have been selected for the patient.                Dialysis/Infusion    No services have been selected for the patient.                Home Medical Care    No services have been selected for the patient.                Therapy    No services have been selected for the patient.                Community Resources    No services have been selected for the patient.                Community & DME    No services have been selected for the patient.                    Transportation Services  Private: Car    Final Discharge Disposition Code: 01 - home or self-care

## 2024-04-12 NOTE — PROGRESS NOTES
Date of Office Visit: 04/15/2024  Encounter Provider: MALATHI Porras  Place of Service: [facility]  Patient Name: Raghav Loco  :1980    Chief complaint  Hospital follow up for CAD, ischemic cardiomyopathy, recent CVA    History of Present Illness  Patient is a 43 y.o. year old male  patient of Dr. Chavez. Past medical history includes  hypertension, diabetes, tobacco abuse, and CAD with multiple reported MIs.      He was seen at an outside facility in May 2021 for chest pain and nausea and vomiting. He was taken to the cath lab and had repeat stenting of the LAD and first diagonal branch. LV gram on heart catheterization showed EF of 25% which was most likely related to ischemic cardiomyopathy. Discussion of possible ICD placement was had, but he has been lost to follow up since.      He presented to the ER on 4/3/2024 complaining of dizziness with associated posterior headache. He rated his headache 10 out of 10. He decided to be evaluated in the ER when his dizziness and pain worsened. He states he has also felt nauseous, but has not vomited.      In the emergency room his vitals were normal. CT brain revealed acute or subacute left cerebellar stroke with mass effect and compression of the fourth ventricle, but no hydrocephalus. Neurosurgery evaluated patient, but surgical intervention was not necessary. Echo with ejection fraction 30-35% with fixed echodensity in the LV apex consistent with hyper trabeculation. Grade 1 diastolic dysfunction, saline injection positive for small PFO.     Interval history  Patient presents today for hospital follow-up.  I will visit with him for the first time today and have reviewed his medical record.  His wife has accompanied him to visit today per his preference.  Since discharge he has been feeling well and has remained very active, playing golf, cornhole, working with no exertional symptoms.  He denies  palpitations, shortness of breath, edema, dizziness, chest pain or chest pressure, fatigue, syncope or presyncope.  He denies any recurrence of his headaches since discharge.  Blood pressure today is well-controlled and blood pressure when checked outside the office has been similar.  Unfortunately, he continues to smoke.    Past Medical History:   Diagnosis Date    Coronary artery disease     Diabetes mellitus      Past Surgical History:   Procedure Laterality Date    CORONARY ANGIOPLASTY WITH STENT PLACEMENT       Outpatient Medications Prior to Visit   Medication Sig Dispense Refill    aspirin 81 MG EC tablet Take 1 tablet by mouth Daily. 90 tablet 3    atorvastatin (LIPITOR) 80 MG tablet Take 1 tablet by mouth Daily. 90 tablet 3    clopidogrel (PLAVIX) 75 MG tablet Take 1 tablet by mouth Daily. 90 tablet 3    losartan (Cozaar) 25 MG tablet Take 1 tablet by mouth Daily. 90 tablet 3    meloxicam (MOBIC) 15 MG tablet Take 1 tablet by mouth Daily. 15 tablet 0    metoprolol succinate XL (TOPROL-XL) 25 MG 24 hr tablet Take 1 tablet by mouth Daily. 90 tablet 3    ondansetron (ZOFRAN) 4 MG tablet Take 1 tablet by mouth.       No facility-administered medications prior to visit.       Allergies as of 04/15/2024    (No Known Allergies)     Social History     Socioeconomic History    Marital status: Single   Tobacco Use    Smoking status: Every Day     Current packs/day: 1.00     Average packs/day: 1 pack/day for 20.0 years (20.0 ttl pk-yrs)     Types: Cigarettes     Start date: 4/4/2004    Smokeless tobacco: Never   Vaping Use    Vaping status: Never Used   Substance and Sexual Activity    Alcohol use: Never    Drug use: Never    Sexual activity: Defer     History reviewed. No pertinent family history.  Review of Systems   Constitutional: Negative for malaise/fatigue.   Cardiovascular:  Negative for chest pain, claudication, dyspnea on exertion, leg swelling, near-syncope, orthopnea, palpitations, paroxysmal nocturnal  "dyspnea and syncope.   Respiratory:  Negative for shortness of breath.    Neurological:  Negative for brief paralysis, dizziness, headaches and light-headedness.   All other systems reviewed and are negative.       Objective:     Vitals:    04/15/24 0918   BP: 118/80   BP Location: Left arm   Patient Position: Sitting   Pulse: 82   Weight: 63.5 kg (140 lb)   Height: 157.5 cm (62\")     Body mass index is 25.61 kg/m².    Vitals reviewed.   Constitutional:       General: Not in acute distress.     Appearance: Well-developed and not in distress. Not diaphoretic.   HENT:      Head: Normocephalic.   Pulmonary:      Effort: Pulmonary effort is normal. No respiratory distress.      Breath sounds: Normal breath sounds. No wheezing. No rhonchi. No rales.   Cardiovascular:      Normal rate. Regular rhythm.      Murmurs: There is no murmur.   Pulses:     Radial: 2+ bilaterally.  Edema:     Peripheral edema absent.   Skin:     General: Skin is warm and dry. There is no cyanosis.      Findings: No rash.   Neurological:      Mental Status: Alert and oriented to person, place, and time.   Psychiatric:         Behavior: Behavior normal. Behavior is cooperative.         Thought Content: Thought content normal.         Judgment: Judgment normal.       Lab Review:     Lab Results   Component Value Date     04/07/2024     04/07/2024    K 4.4 04/07/2024    K 4.1 04/06/2024     (H) 04/07/2024     (H) 04/06/2024    CO2 23.0 04/07/2024    CO2 23.0 04/06/2024    BUN 13 04/07/2024    BUN 14 04/06/2024    CREATININE 0.71 (L) 04/07/2024    CREATININE 0.64 (L) 04/06/2024    EGFRIFNONA 107 12/31/2019    EGFRIFAFRI >60 02/21/2023    EGFRIFAFRI >60 01/17/2023    GLUCOSE 225 (H) 04/07/2024    GLUCOSE 183 (H) 04/06/2024    CALCIUM 8.6 04/07/2024    CALCIUM 8.4 (L) 04/06/2024    ALBUMIN 4.2 04/04/2024    ALBUMIN 4.7 04/03/2024    BILITOT 0.3 04/04/2024    BILITOT 0.7 04/03/2024    AST 11 04/04/2024    AST 11 04/03/2024    ALT 8 " "04/04/2024    ALT 15 04/03/2024     Lab Results   Component Value Date    WBC 8.58 04/04/2024    WBC 9.27 04/03/2024    HGB 15.2 04/04/2024    HGB 16.2 04/03/2024    HCT 46.1 04/04/2024    HCT 48.8 04/03/2024    MCV 88.0 04/04/2024    MCV 90.2 04/03/2024     04/04/2024     04/03/2024     Lab Results   Component Value Date    PROBNP 111.0 04/05/2024    BNP 97.2 (H) 03/15/2022    BNP 40.7 10/29/2021     Lab Results   Component Value Date    TROPONINI <0.010 11/30/2023    TROPONINT <0.010 12/31/2019     No results found for: \"TSH\"   Lipid Panel          4/4/2024    03:32   Lipid Panel   Total Cholesterol 180    Triglycerides 154    HDL Cholesterol 30    VLDL Cholesterol 28    LDL Cholesterol  122    LDL/HDL Ratio 3.97           ECG 12 Lead    Date/Time: 4/15/2024 10:31 AM  Performed by: Courtney Cazares APRN    Authorized by: Courtney Cazares APRN  Comparison: compared with previous ECG   Similar to previous ECG  Rhythm: sinus rhythm  Rate: normal  BPM: 82  Q waves: V1 and V2    QRS axis: normal  Comments: Similar to prior        Assessment:       Diagnosis Plan   1. Acute CVA (cerebrovascular accident)  Holter Monitor - 72 Hour Up To 15 Days      2. Ischemic cardiomyopathy        3. PFO (patent foramen ovale)        4. Coronary artery disease involving native coronary artery of native heart without angina pectoris  Holter Monitor - 72 Hour Up To 15 Days      5. Type 2 diabetes mellitus with hyperglycemia, without long-term current use of insulin  Holter Monitor - 72 Hour Up To 15 Days      6. Mixed hyperlipidemia  Holter Monitor - 72 Hour Up To 15 Days      7. Tobacco use          Plan:       1.  Acute stroke, felt to likely be embolic with cerebral edema.  Now on DAPT with aspirin and plavix. Zio patch was recommended but not initiated at discharge. Will order this today.  2.  Severe cardiomyopathy.  Losartan and Toprol added during hospitalization.  Will plan for stress test once he has been " cleared by neurology.  Visit scheduled for mid May at this point.  Plan for repeat echo in 3 months of GDMT.  3.  Patent foramen ovale with moderate shunt.  As above   4.  Coronary artery disease with prior stent to the LAD with multiple prior MI.  No anginal symptoms at all.  Will plan for stress test as above, continue DAPT and statin.  5.  Hyperlipidemia.  On statin therapy.  Recommend goal LDL less than 70, triglycerides less than 150, HDL greater than 50.  6.  Diabetes. Uncontrolled with A1c >9  7.  Nicotine use. He continues to smoke.  Encourage cessation.  He is not interested in medication assistance for smoking cessation.      Time Spent: I spent 40 minutes caring for Raghav on this date of service. This time includes time spent by me in the following activities: preparing for the visit, reviewing tests, performing a medically appropriate examination and/or evaluations, counseling and educating the patient/family/caregiver, ordering medications, tests, or procedures, documenting information in the medical record, independently interpreting results and communicating that information with the patient/family/caregiver, and Obtaining an outside history.   I spent 1 minutes on the separately reported service of ECG. This time is not included in the time used to support the E/M service also reported today.        Your medication list            Accurate as of April 15, 2024 10:33 AM. If you have any questions, ask your nurse or doctor.                CONTINUE taking these medications        Instructions Last Dose Given Next Dose Due   aspirin 81 MG EC tablet      Take 1 tablet by mouth Daily.       atorvastatin 80 MG tablet  Commonly known as: LIPITOR      Take 1 tablet by mouth Daily.       clopidogrel 75 MG tablet  Commonly known as: PLAVIX      Take 1 tablet by mouth Daily.       losartan 25 MG tablet  Commonly known as: Cozaar      Take 1 tablet by mouth Daily.       meloxicam 15 MG tablet  Commonly  known as: MOBIC      Take 1 tablet by mouth Daily.       metoprolol succinate XL 25 MG 24 hr tablet  Commonly known as: TOPROL-XL      Take 1 tablet by mouth Daily.       ondansetron 4 MG tablet  Commonly known as: ZOFRAN      Take 1 tablet by mouth.                Patient is no longer taking -.  I corrected the med list to reflect this.  I did not stop these medications.    Return in about 3 months (around 7/15/2024) for with Dr. Chavez.      Dictated utilizing Dragon dictation

## 2024-04-15 ENCOUNTER — OFFICE VISIT (OUTPATIENT)
Dept: CARDIOLOGY | Facility: CLINIC | Age: 44
End: 2024-04-15
Payer: COMMERCIAL

## 2024-04-15 VITALS
BODY MASS INDEX: 25.76 KG/M2 | HEART RATE: 82 BPM | SYSTOLIC BLOOD PRESSURE: 118 MMHG | DIASTOLIC BLOOD PRESSURE: 80 MMHG | WEIGHT: 140 LBS | HEIGHT: 62 IN

## 2024-04-15 DIAGNOSIS — I25.5 ISCHEMIC CARDIOMYOPATHY: ICD-10-CM

## 2024-04-15 DIAGNOSIS — I25.10 CORONARY ARTERY DISEASE INVOLVING NATIVE CORONARY ARTERY OF NATIVE HEART WITHOUT ANGINA PECTORIS: ICD-10-CM

## 2024-04-15 DIAGNOSIS — E78.2 MIXED HYPERLIPIDEMIA: ICD-10-CM

## 2024-04-15 DIAGNOSIS — I63.9 ACUTE CVA (CEREBROVASCULAR ACCIDENT): Primary | ICD-10-CM

## 2024-04-15 DIAGNOSIS — Z72.0 TOBACCO USE: ICD-10-CM

## 2024-04-15 DIAGNOSIS — Q21.12 PFO (PATENT FORAMEN OVALE): ICD-10-CM

## 2024-04-15 DIAGNOSIS — E11.65 TYPE 2 DIABETES MELLITUS WITH HYPERGLYCEMIA, WITHOUT LONG-TERM CURRENT USE OF INSULIN: ICD-10-CM

## 2024-04-15 PROCEDURE — 93000 ELECTROCARDIOGRAM COMPLETE: CPT | Performed by: NURSE PRACTITIONER

## 2024-04-15 PROCEDURE — 99215 OFFICE O/P EST HI 40 MIN: CPT | Performed by: NURSE PRACTITIONER

## 2024-05-17 ENCOUNTER — HOSPITAL ENCOUNTER (OUTPATIENT)
Facility: HOSPITAL | Age: 44
Setting detail: OBSERVATION
Discharge: HOME | End: 2024-05-18
Attending: EMERGENCY MEDICINE | Admitting: EMERGENCY MEDICINE
Payer: COMMERCIAL

## 2024-05-17 ENCOUNTER — APPOINTMENT (OUTPATIENT)
Dept: GENERAL RADIOLOGY | Facility: HOSPITAL | Age: 44
End: 2024-05-17
Payer: COMMERCIAL

## 2024-05-17 DIAGNOSIS — R11.2 INTRACTABLE NAUSEA AND VOMITING: Primary | ICD-10-CM

## 2024-05-17 DIAGNOSIS — D72.829 LEUKOCYTOSIS, UNSPECIFIED TYPE: ICD-10-CM

## 2024-05-17 DIAGNOSIS — E11.65 POORLY CONTROLLED DIABETES MELLITUS: ICD-10-CM

## 2024-05-17 LAB
BASOPHILS # BLD AUTO: 0.07 10*3/MM3 (ref 0–0.2)
BASOPHILS NFR BLD AUTO: 0.4 % (ref 0–1.5)
DEPRECATED RDW RBC AUTO: 38.8 FL (ref 37–54)
EOSINOPHIL # BLD AUTO: 0.01 10*3/MM3 (ref 0–0.4)
EOSINOPHIL NFR BLD AUTO: 0.1 % (ref 0.3–6.2)
ERYTHROCYTE [DISTWIDTH] IN BLOOD BY AUTOMATED COUNT: 11.9 % (ref 12.3–15.4)
HCT VFR BLD AUTO: 50.1 % (ref 37.5–51)
HGB BLD-MCNC: 16.8 G/DL (ref 13–17.7)
HOLD SPECIMEN: NORMAL
IMM GRANULOCYTES # BLD AUTO: 0.08 10*3/MM3 (ref 0–0.05)
IMM GRANULOCYTES NFR BLD AUTO: 0.4 % (ref 0–0.5)
LYMPHOCYTES # BLD AUTO: 1.06 10*3/MM3 (ref 0.7–3.1)
LYMPHOCYTES NFR BLD AUTO: 5.4 % (ref 19.6–45.3)
MCH RBC QN AUTO: 30.1 PG (ref 26.6–33)
MCHC RBC AUTO-ENTMCNC: 33.5 G/DL (ref 31.5–35.7)
MCV RBC AUTO: 89.8 FL (ref 79–97)
MONOCYTES # BLD AUTO: 1.44 10*3/MM3 (ref 0.1–0.9)
MONOCYTES NFR BLD AUTO: 7.4 % (ref 5–12)
NEUTROPHILS NFR BLD AUTO: 16.81 10*3/MM3 (ref 1.7–7)
NEUTROPHILS NFR BLD AUTO: 86.3 % (ref 42.7–76)
NRBC BLD AUTO-RTO: 0 /100 WBC (ref 0–0.2)
PLATELET # BLD AUTO: 187 10*3/MM3 (ref 140–450)
PMV BLD AUTO: 12.3 FL (ref 6–12)
RBC # BLD AUTO: 5.58 10*6/MM3 (ref 4.14–5.8)
WBC NRBC COR # BLD AUTO: 19.47 10*3/MM3 (ref 3.4–10.8)
WHOLE BLOOD HOLD COAG: NORMAL
WHOLE BLOOD HOLD SPECIMEN: NORMAL

## 2024-05-17 PROCEDURE — 84484 ASSAY OF TROPONIN QUANT: CPT | Performed by: EMERGENCY MEDICINE

## 2024-05-17 PROCEDURE — 93005 ELECTROCARDIOGRAM TRACING: CPT

## 2024-05-17 PROCEDURE — 83690 ASSAY OF LIPASE: CPT | Performed by: PHYSICIAN ASSISTANT

## 2024-05-17 PROCEDURE — 93005 ELECTROCARDIOGRAM TRACING: CPT | Performed by: EMERGENCY MEDICINE

## 2024-05-17 PROCEDURE — 71046 X-RAY EXAM CHEST 2 VIEWS: CPT

## 2024-05-17 PROCEDURE — 85025 COMPLETE CBC W/AUTO DIFF WBC: CPT

## 2024-05-17 PROCEDURE — 80053 COMPREHEN METABOLIC PANEL: CPT | Performed by: EMERGENCY MEDICINE

## 2024-05-17 PROCEDURE — 99285 EMERGENCY DEPT VISIT HI MDM: CPT

## 2024-05-17 RX ORDER — ALUMINA, MAGNESIA, AND SIMETHICONE 2400; 2400; 240 MG/30ML; MG/30ML; MG/30ML
15 SUSPENSION ORAL ONCE
Status: COMPLETED | OUTPATIENT
Start: 2024-05-18 | End: 2024-05-18

## 2024-05-17 RX ORDER — SODIUM CHLORIDE 0.9 % (FLUSH) 0.9 %
10 SYRINGE (ML) INJECTION AS NEEDED
Status: DISCONTINUED | OUTPATIENT
Start: 2024-05-17 | End: 2024-05-18 | Stop reason: HOSPADM

## 2024-05-17 RX ORDER — DROPERIDOL 2.5 MG/ML
2.5 INJECTION, SOLUTION INTRAMUSCULAR; INTRAVENOUS ONCE
Status: COMPLETED | OUTPATIENT
Start: 2024-05-18 | End: 2024-05-18

## 2024-05-17 RX ORDER — ASPIRIN 325 MG
325 TABLET ORAL ONCE
Status: DISCONTINUED | OUTPATIENT
Start: 2024-05-17 | End: 2024-05-18 | Stop reason: HOSPADM

## 2024-05-17 RX ORDER — FAMOTIDINE 10 MG/ML
20 INJECTION, SOLUTION INTRAVENOUS ONCE
Status: COMPLETED | OUTPATIENT
Start: 2024-05-18 | End: 2024-05-18

## 2024-05-18 ENCOUNTER — APPOINTMENT (OUTPATIENT)
Dept: CT IMAGING | Facility: HOSPITAL | Age: 44
End: 2024-05-18
Payer: COMMERCIAL

## 2024-05-18 VITALS
HEIGHT: 62 IN | BODY MASS INDEX: 25.52 KG/M2 | WEIGHT: 138.67 LBS | DIASTOLIC BLOOD PRESSURE: 64 MMHG | SYSTOLIC BLOOD PRESSURE: 111 MMHG | OXYGEN SATURATION: 95 % | TEMPERATURE: 98.4 F | RESPIRATION RATE: 16 BRPM | HEART RATE: 106 BPM

## 2024-05-18 PROBLEM — R11.2 NAUSEA & VOMITING: Status: ACTIVE | Noted: 2024-05-18

## 2024-05-18 LAB
ALBUMIN SERPL-MCNC: 4.9 G/DL (ref 3.5–5.2)
ALBUMIN/GLOB SERPL: 1.6 G/DL
ALP SERPL-CCNC: 98 U/L (ref 39–117)
ALT SERPL W P-5'-P-CCNC: 18 U/L (ref 1–41)
ANION GAP SERPL CALCULATED.3IONS-SCNC: 18.3 MMOL/L (ref 5–15)
ANION GAP SERPL CALCULATED.3IONS-SCNC: 8.5 MMOL/L (ref 5–15)
AST SERPL-CCNC: 8 U/L (ref 1–40)
ATMOSPHERIC PRESS: 744.5 MMHG
BASE EXCESS BLDV CALC-SCNC: 3 MMOL/L (ref -2–2)
BILIRUB SERPL-MCNC: 1.3 MG/DL (ref 0–1.2)
BUN SERPL-MCNC: 15 MG/DL (ref 6–20)
BUN SERPL-MCNC: 15 MG/DL (ref 6–20)
BUN/CREAT SERPL: 19 (ref 7–25)
BUN/CREAT SERPL: 25 (ref 7–25)
CALCIUM SPEC-SCNC: 8.2 MG/DL (ref 8.6–10.5)
CALCIUM SPEC-SCNC: 9.8 MG/DL (ref 8.6–10.5)
CHLORIDE SERPL-SCNC: 104 MMOL/L (ref 98–107)
CHLORIDE SERPL-SCNC: 95 MMOL/L (ref 98–107)
CO2 BLDA-SCNC: 29.2 MMOL/L (ref 23–27)
CO2 SERPL-SCNC: 23.5 MMOL/L (ref 22–29)
CO2 SERPL-SCNC: 25.7 MMOL/L (ref 22–29)
CREAT SERPL-MCNC: 0.6 MG/DL (ref 0.76–1.27)
CREAT SERPL-MCNC: 0.79 MG/DL (ref 0.76–1.27)
DEPRECATED RDW RBC AUTO: 40.1 FL (ref 37–54)
EGFRCR SERPLBLD CKD-EPI 2021: 111.6 ML/MIN/1.73
EGFRCR SERPLBLD CKD-EPI 2021: 121.3 ML/MIN/1.73
ERYTHROCYTE [DISTWIDTH] IN BLOOD BY AUTOMATED COUNT: 12.2 % (ref 12.3–15.4)
GEN 5 2HR TROPONIN T REFLEX: 12 NG/L
GLOBULIN UR ELPH-MCNC: 3.1 GM/DL
GLUCOSE BLDC GLUCOMTR-MCNC: 171 MG/DL (ref 70–130)
GLUCOSE BLDC GLUCOMTR-MCNC: 286 MG/DL (ref 70–130)
GLUCOSE SERPL-MCNC: 205 MG/DL (ref 65–99)
GLUCOSE SERPL-MCNC: 383 MG/DL (ref 65–99)
HCO3 BLDV-SCNC: 27.9 MMOL/L (ref 22–28)
HCT VFR BLD AUTO: 40.4 % (ref 37.5–51)
HGB BLD-MCNC: 13.3 G/DL (ref 13–17.7)
HOLD SPECIMEN: NORMAL
INHALED O2 CONCENTRATION: 21 %
LIPASE SERPL-CCNC: 80 U/L (ref 13–60)
MAGNESIUM SERPL-MCNC: 2.3 MG/DL (ref 1.6–2.6)
MCH RBC QN AUTO: 29.6 PG (ref 26.6–33)
MCHC RBC AUTO-ENTMCNC: 32.9 G/DL (ref 31.5–35.7)
MCV RBC AUTO: 89.8 FL (ref 79–97)
MODALITY: ABNORMAL
PCO2 BLDV: 42.4 MM HG (ref 41–51)
PH BLDV: 7.43 PH UNITS (ref 7.31–7.41)
PLATELET # BLD AUTO: 142 10*3/MM3 (ref 140–450)
PMV BLD AUTO: 12.3 FL (ref 6–12)
PO2 BLDV: 30.8 MM HG (ref 35–45)
POTASSIUM SERPL-SCNC: 3.3 MMOL/L (ref 3.5–5.2)
POTASSIUM SERPL-SCNC: 3.8 MMOL/L (ref 3.5–5.2)
PROT SERPL-MCNC: 8 G/DL (ref 6–8.5)
QT INTERVAL: 346 MS
QTC INTERVAL: 438 MS
RBC # BLD AUTO: 4.5 10*6/MM3 (ref 4.14–5.8)
SAO2 % BLDCOV: 60.4 % (ref 45–75)
SODIUM SERPL-SCNC: 136 MMOL/L (ref 136–145)
SODIUM SERPL-SCNC: 139 MMOL/L (ref 136–145)
TOTAL RATE: 24 BREATHS/MINUTE
TROPONIN T DELTA: 1 NG/L
TROPONIN T SERPL HS-MCNC: 11 NG/L
TROPONIN T SERPL HS-MCNC: 11 NG/L
WBC NRBC COR # BLD AUTO: 16.08 10*3/MM3 (ref 3.4–10.8)

## 2024-05-18 PROCEDURE — 84484 ASSAY OF TROPONIN QUANT: CPT | Performed by: EMERGENCY MEDICINE

## 2024-05-18 PROCEDURE — 25010000002 DROPERIDOL PER 5 MG: Performed by: PHYSICIAN ASSISTANT

## 2024-05-18 PROCEDURE — 96375 TX/PRO/DX INJ NEW DRUG ADDON: CPT

## 2024-05-18 PROCEDURE — G0378 HOSPITAL OBSERVATION PER HR: HCPCS

## 2024-05-18 PROCEDURE — 82803 BLOOD GASES ANY COMBINATION: CPT

## 2024-05-18 PROCEDURE — 85027 COMPLETE CBC AUTOMATED: CPT | Performed by: EMERGENCY MEDICINE

## 2024-05-18 PROCEDURE — 74177 CT ABD & PELVIS W/CONTRAST: CPT

## 2024-05-18 PROCEDURE — 63710000001 INSULIN REGULAR HUMAN PER 5 UNITS

## 2024-05-18 PROCEDURE — 96361 HYDRATE IV INFUSION ADD-ON: CPT

## 2024-05-18 PROCEDURE — 25810000003 SODIUM CHLORIDE 0.9 % SOLUTION

## 2024-05-18 PROCEDURE — 25010000002 ONDANSETRON PER 1 MG: Performed by: PHYSICIAN ASSISTANT

## 2024-05-18 PROCEDURE — 25810000003 SODIUM CHLORIDE 0.9 % SOLUTION: Performed by: PHYSICIAN ASSISTANT

## 2024-05-18 PROCEDURE — 25510000001 IOPAMIDOL 61 % SOLUTION: Performed by: EMERGENCY MEDICINE

## 2024-05-18 PROCEDURE — 82948 REAGENT STRIP/BLOOD GLUCOSE: CPT

## 2024-05-18 PROCEDURE — 80048 BASIC METABOLIC PNL TOTAL CA: CPT | Performed by: EMERGENCY MEDICINE

## 2024-05-18 PROCEDURE — 83735 ASSAY OF MAGNESIUM: CPT | Performed by: NURSE PRACTITIONER

## 2024-05-18 PROCEDURE — 63710000001 INSULIN GLARGINE PER 5 UNITS: Performed by: EMERGENCY MEDICINE

## 2024-05-18 PROCEDURE — 96374 THER/PROPH/DIAG INJ IV PUSH: CPT

## 2024-05-18 PROCEDURE — 36415 COLL VENOUS BLD VENIPUNCTURE: CPT

## 2024-05-18 RX ORDER — SODIUM CHLORIDE 9 MG/ML
150 INJECTION, SOLUTION INTRAVENOUS CONTINUOUS
Status: DISCONTINUED | OUTPATIENT
Start: 2024-05-18 | End: 2024-05-18 | Stop reason: HOSPADM

## 2024-05-18 RX ORDER — ASPIRIN 81 MG/1
81 TABLET ORAL DAILY
Status: DISCONTINUED | OUTPATIENT
Start: 2024-05-18 | End: 2024-05-18 | Stop reason: HOSPADM

## 2024-05-18 RX ORDER — ATORVASTATIN CALCIUM 80 MG/1
80 TABLET, FILM COATED ORAL DAILY
Status: DISCONTINUED | OUTPATIENT
Start: 2024-05-18 | End: 2024-05-18 | Stop reason: HOSPADM

## 2024-05-18 RX ORDER — NITROGLYCERIN 0.4 MG/1
0.4 TABLET SUBLINGUAL
Status: DISCONTINUED | OUTPATIENT
Start: 2024-05-18 | End: 2024-05-18 | Stop reason: HOSPADM

## 2024-05-18 RX ORDER — CLOPIDOGREL BISULFATE 75 MG/1
75 TABLET ORAL DAILY
Status: DISCONTINUED | OUTPATIENT
Start: 2024-05-18 | End: 2024-05-18 | Stop reason: HOSPADM

## 2024-05-18 RX ORDER — SODIUM CHLORIDE 0.9 % (FLUSH) 0.9 %
10 SYRINGE (ML) INJECTION EVERY 12 HOURS SCHEDULED
Status: DISCONTINUED | OUTPATIENT
Start: 2024-05-18 | End: 2024-05-18 | Stop reason: HOSPADM

## 2024-05-18 RX ORDER — LOSARTAN POTASSIUM 25 MG/1
25 TABLET ORAL DAILY
Status: DISCONTINUED | OUTPATIENT
Start: 2024-05-18 | End: 2024-05-18 | Stop reason: HOSPADM

## 2024-05-18 RX ORDER — POTASSIUM CHLORIDE 750 MG/1
40 TABLET, FILM COATED, EXTENDED RELEASE ORAL EVERY 4 HOURS
Status: DISCONTINUED | OUTPATIENT
Start: 2024-05-18 | End: 2024-05-18 | Stop reason: HOSPADM

## 2024-05-18 RX ORDER — IBUPROFEN 600 MG/1
1 TABLET ORAL
Status: DISCONTINUED | OUTPATIENT
Start: 2024-05-18 | End: 2024-05-18 | Stop reason: HOSPADM

## 2024-05-18 RX ORDER — DEXTROSE MONOHYDRATE 25 G/50ML
25 INJECTION, SOLUTION INTRAVENOUS
Status: DISCONTINUED | OUTPATIENT
Start: 2024-05-18 | End: 2024-05-18 | Stop reason: HOSPADM

## 2024-05-18 RX ORDER — ONDANSETRON 4 MG/1
4 TABLET, ORALLY DISINTEGRATING ORAL EVERY 6 HOURS PRN
Status: DISCONTINUED | OUTPATIENT
Start: 2024-05-18 | End: 2024-05-18 | Stop reason: HOSPADM

## 2024-05-18 RX ORDER — SODIUM CHLORIDE 0.9 % (FLUSH) 0.9 %
10 SYRINGE (ML) INJECTION AS NEEDED
Status: DISCONTINUED | OUTPATIENT
Start: 2024-05-18 | End: 2024-05-18 | Stop reason: HOSPADM

## 2024-05-18 RX ORDER — SODIUM CHLORIDE 9 MG/ML
40 INJECTION, SOLUTION INTRAVENOUS AS NEEDED
Status: DISCONTINUED | OUTPATIENT
Start: 2024-05-18 | End: 2024-05-18 | Stop reason: HOSPADM

## 2024-05-18 RX ORDER — NICOTINE POLACRILEX 4 MG
15 LOZENGE BUCCAL
Status: DISCONTINUED | OUTPATIENT
Start: 2024-05-18 | End: 2024-05-18 | Stop reason: HOSPADM

## 2024-05-18 RX ORDER — GLIPIZIDE 5 MG/1
5 TABLET ORAL
Qty: 60 TABLET | Refills: 0 | Status: SHIPPED | OUTPATIENT
Start: 2024-05-18

## 2024-05-18 RX ORDER — ONDANSETRON 4 MG/1
4 TABLET, ORALLY DISINTEGRATING ORAL EVERY 8 HOURS PRN
Qty: 20 TABLET | Refills: 0 | Status: SHIPPED | OUTPATIENT
Start: 2024-05-18

## 2024-05-18 RX ORDER — ONDANSETRON 2 MG/ML
4 INJECTION INTRAMUSCULAR; INTRAVENOUS EVERY 6 HOURS PRN
Status: DISCONTINUED | OUTPATIENT
Start: 2024-05-18 | End: 2024-05-18 | Stop reason: HOSPADM

## 2024-05-18 RX ORDER — ONDANSETRON 2 MG/ML
4 INJECTION INTRAMUSCULAR; INTRAVENOUS ONCE
Status: COMPLETED | OUTPATIENT
Start: 2024-05-18 | End: 2024-05-18

## 2024-05-18 RX ORDER — METOPROLOL SUCCINATE 25 MG/1
25 TABLET, EXTENDED RELEASE ORAL DAILY
Status: DISCONTINUED | OUTPATIENT
Start: 2024-05-18 | End: 2024-05-18 | Stop reason: HOSPADM

## 2024-05-18 RX ADMIN — SODIUM CHLORIDE 500 ML: 9 INJECTION, SOLUTION INTRAVENOUS at 03:18

## 2024-05-18 RX ADMIN — ALUMINUM HYDROXIDE, MAGNESIUM HYDROXIDE, AND DIMETHICONE 15 ML: 400; 400; 40 SUSPENSION ORAL at 00:16

## 2024-05-18 RX ADMIN — INSULIN HUMAN 6 UNITS: 100 INJECTION, SOLUTION PARENTERAL at 06:23

## 2024-05-18 RX ADMIN — LOSARTAN POTASSIUM 25 MG: 25 TABLET, FILM COATED ORAL at 09:59

## 2024-05-18 RX ADMIN — SODIUM CHLORIDE 150 ML/HR: 9 INJECTION, SOLUTION INTRAVENOUS at 04:27

## 2024-05-18 RX ADMIN — IOPAMIDOL 85 ML: 612 INJECTION, SOLUTION INTRAVENOUS at 00:46

## 2024-05-18 RX ADMIN — Medication 10 ML: at 03:18

## 2024-05-18 RX ADMIN — CLOPIDOGREL BISULFATE 75 MG: 75 TABLET, FILM COATED ORAL at 09:59

## 2024-05-18 RX ADMIN — METOPROLOL SUCCINATE 25 MG: 25 TABLET, EXTENDED RELEASE ORAL at 09:59

## 2024-05-18 RX ADMIN — DROPERIDOL 2.5 MG: 2.5 INJECTION, SOLUTION INTRAMUSCULAR; INTRAVENOUS at 00:18

## 2024-05-18 RX ADMIN — ONDANSETRON 4 MG: 2 INJECTION INTRAMUSCULAR; INTRAVENOUS at 03:17

## 2024-05-18 RX ADMIN — ASPIRIN 81 MG: 81 TABLET, COATED ORAL at 09:59

## 2024-05-18 RX ADMIN — SODIUM CHLORIDE 1000 ML: 9 INJECTION, SOLUTION INTRAVENOUS at 00:17

## 2024-05-18 RX ADMIN — Medication 10 ML: at 09:59

## 2024-05-18 RX ADMIN — ATORVASTATIN CALCIUM 80 MG: 80 TABLET, FILM COATED ORAL at 09:59

## 2024-05-18 RX ADMIN — FAMOTIDINE 20 MG: 10 INJECTION INTRAVENOUS at 00:18

## 2024-05-18 RX ADMIN — SODIUM CHLORIDE 150 ML/HR: 9 INJECTION, SOLUTION INTRAVENOUS at 10:47

## 2024-05-18 RX ADMIN — POTASSIUM CHLORIDE 40 MEQ: 750 TABLET, EXTENDED RELEASE ORAL at 15:12

## 2024-05-18 RX ADMIN — INSULIN GLARGINE 20 UNITS: 100 INJECTION, SOLUTION SUBCUTANEOUS at 09:59

## 2024-05-18 NOTE — ED NOTES
Nursing report ED to floor  Raghav Loco  43 y.o.  male    HPI :  HPI (Adult)  Stated Reason for Visit: vomiting for 2 days and now the PT has chest pain  History Obtained From: patient    Chief Complaint  Chief Complaint   Patient presents with    Vomiting    Chest Pain       Admitting doctor:   Yasmin Nicole MD    Admitting diagnosis:   The primary encounter diagnosis was Intractable nausea and vomiting. Diagnoses of Poorly controlled diabetes mellitus and Leukocytosis, unspecified type were also pertinent to this visit.    Code status:   Current Code Status       Date Active Code Status Order ID Comments User Context       5/18/2024 0258 CPR (Attempt to Resuscitate) [id number]  Katherine Reed, MALATHI ED        Question Answer    Code Status (Patient has no pulse and is not breathing) CPR (Attempt to Resuscitate)    Medical Interventions (Patient has pulse or is breathing) Full Support                    Allergies:   Patient has no known allergies.    Isolation:   No active isolations    Intake and Output  No intake or output data in the 24 hours ending 05/18/24 0307    Weight:       05/17/24  2308   Weight: 65.8 kg (145 lb)       Most recent vitals:   Vitals:    05/17/24 2310 05/18/24 0001 05/18/24 0101 05/18/24 0201   BP: (!) 160/103 141/90 152/95 131/91   BP Location: Right arm      Patient Position: Sitting      Pulse:  103 105 108   Resp:       Temp:       TempSrc:       SpO2:  97% 97% 94%   Weight:       Height:           Active LDAs/IV Access:   Lines, Drains & Airways       Active LDAs       Name Placement date Placement time Site Days    Peripheral IV 04/03/24 1601 Right Antecubital 04/03/24  1601  Antecubital  44    Peripheral IV 05/18/24 0017 Left Antecubital 05/18/24  0017  Antecubital  less than 1                    Labs (abnormal labs have a star):   Labs Reviewed   COMPREHENSIVE METABOLIC PANEL - Abnormal; Notable for the following components:       Result Value    Glucose 383 (*)     Chloride  95 (*)     Total Bilirubin 1.3 (*)     Anion Gap 18.3 (*)     All other components within normal limits    Narrative:     GFR Normal >60  Chronic Kidney Disease <60  Kidney Failure <15     CBC WITH AUTO DIFFERENTIAL - Abnormal; Notable for the following components:    WBC 19.47 (*)     RDW 11.9 (*)     MPV 12.3 (*)     Neutrophil % 86.3 (*)     Lymphocyte % 5.4 (*)     Eosinophil % 0.1 (*)     Neutrophils, Absolute 16.81 (*)     Monocytes, Absolute 1.44 (*)     Immature Grans, Absolute 0.08 (*)     All other components within normal limits   LIPASE - Abnormal; Notable for the following components:    Lipase 80 (*)     All other components within normal limits   BLOOD GAS, VENOUS - Abnormal; Notable for the following components:    pH, Venous 7.426 (*)     pO2, Venous 30.8 (*)     Base Excess, Venous 3.0 (*)     CO2 Content 29.2 (*)     All other components within normal limits   TROPONIN - Normal    Narrative:     High Sensitive Troponin T Reference Range:  <14.0 ng/L- Negative Female for AMI  <22.0 ng/L- Negative Male for AMI  >=14 - Abnormal Female indicating possible myocardial injury.  >=22 - Abnormal Male indicating possible myocardial injury.   Clinicians would have to utilize clinical acumen, EKG, Troponin, and serial changes to determine if it is an Acute Myocardial Infarction or myocardial injury due to an underlying chronic condition.        HIGH SENSITIVITIY TROPONIN T 2HR - Normal    Narrative:     High Sensitive Troponin T Reference Range:  <14.0 ng/L- Negative Female for AMI  <22.0 ng/L- Negative Male for AMI  >=14 - Abnormal Female indicating possible myocardial injury.  >=22 - Abnormal Male indicating possible myocardial injury.   Clinicians would have to utilize clinical acumen, EKG, Troponin, and serial changes to determine if it is an Acute Myocardial Infarction or myocardial injury due to an underlying chronic condition.        RAINBOW DRAW    Narrative:     The following orders were created for  panel order [place] Draw.  Procedure                               Abnormality         Status                     ---------                               -----------         ------                     Green Top (Gel)[[id number]]                                  Final result               Lavender Top[[id number]]                                     Final result               Gold Top - SST[[id number]]                                   Final result               Light Blue Top[[id number]]                                   Final result                 Please view results for these tests on the individual orders.   BLOOD GAS, VENOUS   CBC AND DIFFERENTIAL    Narrative:     The following orders were created for panel order CBC & Differential.  Procedure                               Abnormality         Status                     ---------                               -----------         ------                     CBC Auto Differential[[id number]]        Abnormal            Final result                 Please view results for these tests on the individual orders.   GREEN TOP   LAVENDER TOP   GOLD TOP - SST   LIGHT BLUE TOP       EKG:   ECG 12 Lead ED Triage Standing Order; Chest Pain   Preliminary Result   HEART RATE= 96  bpm   RR Interval= 625  ms   FL Interval= 153  ms   P Horizontal Axis= -7  deg   P Front Axis= 81  deg   QRSD Interval= 85  ms   QT Interval= 346  ms   QTcB= 438  ms   QRS Axis= 55  deg   T Wave Axis= 61  deg   - ABNORMAL ECG -   Sinus tachycardia   Multiform ventricular premature complexes   Biatrial enlargement   Probable anteroseptal infarct, old   Electronically Signed By:    Date and Time of Study: 2024-05-17 23:18:52          Meds given in ED:   Medications   sodium chloride 0.9 % flush 10 mL (has no administration in time range)   aspirin tablet 325 mg (325 mg Oral Not Given 5/18/24 0105)   ondansetron (ZOFRAN) injection 4 mg (has no administration in time range)   sodium chloride 0.9 % bolus 500 mL  (has no administration in time range)   sodium chloride 0.9 % flush 10 mL (has no administration in time range)   sodium chloride 0.9 % flush 10 mL (has no administration in time range)   sodium chloride 0.9 % infusion 40 mL (has no administration in time range)   ondansetron ODT (ZOFRAN-ODT) disintegrating tablet 4 mg (has no administration in time range)     Or   ondansetron (ZOFRAN) injection 4 mg (has no administration in time range)   nitroglycerin (NITROSTAT) SL tablet 0.4 mg (has no administration in time range)   sodium chloride 0.9 % infusion (has no administration in time range)   Potassium Replacement - Follow Nurse / BPA Driven Protocol (has no administration in time range)   Magnesium Standard Dose Replacement - Follow Nurse / BPA Driven Protocol (has no administration in time range)   Phosphorus Replacement - Follow Nurse / BPA Driven Protocol (has no administration in time range)   Calcium Replacement - Follow Nurse / BPA Driven Protocol (has no administration in time range)   aluminum-magnesium hydroxide-simethicone (MAALOX MAX) 400-400-40 MG/5ML suspension 15 mL (15 mL Oral Given 5/18/24 0016)   famotidine (PEPCID) injection 20 mg (20 mg Intravenous Given 5/18/24 0018)   sodium chloride 0.9 % bolus 1,000 mL (1,000 mL Intravenous New Bag 5/18/24 0017)   droperidol (INAPSINE) injection 2.5 mg (2.5 mg Intravenous Given 5/18/24 0018)   iopamidol (ISOVUE-300) 61 % injection 100 mL (85 mL Intravenous Given 5/18/24 0046)       Imaging results:  CT Abdomen Pelvis With Contrast    Result Date: 5/18/2024  Electronically signed by Robin Kemp MD on 05-18-24 at 0206    XR Chest 2 View    Result Date: 5/18/2024  Electronically signed by Yvrose Cha MD on 05-18-24 at 0154     Ambulatory status:   - assist x1    Social issues:   Social History     Socioeconomic History    Marital status: Single   Tobacco Use    Smoking status: Every Day     Current packs/day: 1.00     Average packs/day: 1 pack/day for 20.1  years (20.1 ttl pk-yrs)     Types: Cigarettes     Start date: 4/4/2004    Smokeless tobacco: Never   Vaping Use    Vaping status: Never Used   Substance and Sexual Activity    Alcohol use: Never    Drug use: Never    Sexual activity: Defer       Peripheral Neurovascular  Peripheral Neurovascular (Adult)  Peripheral Neurovascular WDL: WDL    Neuro Cognitive  Neuro Cognitive (Adult)  Cognitive/Neuro/Behavioral WDL: WDL    Learning  Learning Assessment (Adult)  Learning Readiness and Ability: no barriers identified  Education Provided  Person Taught: patient  Teaching Method: verbal instruction  Teaching Focus: symptom/problem overview    Respiratory  Respiratory WDL  Respiratory WDL: WDL    Abdominal Pain       Pain Assessments  Pain (Adult)  (0-10) Pain Rating: Rest: 10  (0-10) Pain Rating: Activity: 10    NIH Stroke Scale       Maral Bernstein RN  05/18/24 03:07 EDT

## 2024-05-18 NOTE — ED PROVIDER NOTES
"SHARED VISIT: This visit was performed by BOTH a physician and an APC. The substantive portion of the medical decision making was performed by this attesting physician who made or approved the management plan and takes responsibility for patient management. All studies in the APC note (if performed) were independently interpreted by me.     The SHER and I have discussed this patient's history, physical exam, and treatment plan.  I have reviewed the documentation and personally had a face to face interaction with the patient. I affirm the documentation and agree with the treatment and plan.  The attached note describes my personal findings.      I provided a substantive portion of the care of the patient.  I personally performed the physical exam in its entirety, and below are my findings.     Brief history of present illness: 43-year-old male complaining of nausea and vomiting that began a day and a half ago and is worsened throughout the day.  Some epigastric discomfort.  Patient reports generalized fatigue but no fevers or chest pain.    Physical exam:    /90   Pulse 103   Temp 98.6 °F (37 °C) (Tympanic)   Resp 18   Ht 157.5 cm (62\")   Wt 65.8 kg (145 lb)   SpO2 97%   BMI 26.52 kg/m²       Physical Exam   Constitutional: No distress.  Nontoxic  HENT:  Head: Normocephalic and atraumatic.   Oropharynx: Mucous membranes are moist.   Eyes: . No scleral icterus.   Neck: Normal range of motion. Neck supple.   Cardiovascular: Pink warm and well perfused throughout.  Tachycardic but regular  Pulmonary/Chest: No respiratory distress.    Abdominal: Soft. There is no rebound or rigidity.  Very mild epigastric discomfort.  Musculoskeletal: Moves all extremities equally.    Neurological: Alert and oriented.  Speech fluent and easily intelligible  Skin: Skin is pink, warm, and dry.   Psychiatric: Mood and affect normal.   Nursing note and vitals reviewed.        MDM:    My differential diagnosis for abdominal pain " includes but is not limited to:  Gastritis, gastroenteritis, peptic ulcer disease, GERD, esophageal perforation, acute appendicitis, mesenteric adenitis, Meckel’s diverticulum, epiploic appendagitis, diverticulitis, colon cancer, ulcerative colitis, Crohn’s disease, intussusception, small bowel obstruction, adhesions, ischemic bowel, perforated viscus, ileus, obstipation, biliary colic, cholecystitis, cholelithiasis, Eriberto-Clemente Mj, hepatitis, pancreatitis, common bile duct obstruction, cholangitis, bile leak, splenic trauma, splenic rupture, splenic infarction, splenic abscess, abdominal abscess, ascites, spontaneous bacterial peritonitis, hernia, UTI, cystitis, prostatitis, ureterolithiasis, urinary obstruction, AAA, myocardial infarction, pneumonia, cancer, porphyria, DKA, medications, sickle cell, viral syndrome, zoster    RADIOLOGY      Study: Single view chest  Findings: No pneumothorax or focal infiltrate.  No free air under the diaphragm appreciated  I independently viewed and interpreted these images contemporaneously with treatment.       Please note that portions of this were completed with a voice recognition program.       Note Disclaimer: At [facility], we believe that sharing information builds trust and better relationships. You are receiving this note because you are receiving care at [facility] or recently visited. It is possible you will see health information before a provider has talked with you about it. This kind of information can be easy to misunderstand. To help you fully understand what it means for your health, we urge you to discuss this note with your provider.     Anjel Randolph MD  05/18/24 0035

## 2024-05-18 NOTE — H&P
[facility]   HISTORY AND PHYSICAL    Patient Name: Raghav Loco  : 1980  MRN: [id number]  Primary Care Physician:  Yonathan Allison MD  Date of admission: 2024    Subjective   Subjective     Chief Complaint:   Chief Complaint   Patient presents with    Vomiting    Chest Pain         HPI:    Raghav Loco is a 43 y.o. male, with a past medical history including, but not limited to, CVA, coronary artery disease, diabetes, presented to the emergency department with a complaint of nausea and vomiting since Wednesday evening.  Patient states that he ate some Chinese food on Wednesday evening and began with nausea and vomiting approximately 1 hour after eating the food.  He states that he had diarrhea on Wednesday night and Thursday but has not had any since that time.  He denies any fever, chills, shortness of breath, urinary symptoms, or cough.  He states that he does have a burning in his epigastric area, chest, and throat after vomiting.  Upon arrival to the observation unit he states that he would like to go home so he can sleep.  He denies any nausea or vomiting at this time.  He is requesting something to drink.  He was given a popsicle and we will advance his diet as tolerated.    Review of Systems   All systems were reviewed and negative except for: What was mentioned above in the HPI.    Personal History     Past Medical History:   Diagnosis Date    Coronary artery disease     Diabetes mellitus        Past Surgical History:   Procedure Laterality Date    CORONARY ANGIOPLASTY WITH STENT PLACEMENT         Family History: family history is not on file. Otherwise pertinent FHx was reviewed and not pertinent to current issue.    Social History:  reports that he has been smoking cigarettes. He started smoking about 20 years ago. He has a 20.1 pack-year smoking history. He has never used smokeless tobacco. He reports that he does not drink alcohol and does not use drugs.    Home  Medications:  aspirin, atorvastatin, clopidogrel, losartan, meloxicam, metoprolol succinate XL, and ondansetron    Allergies:  No Known Allergies    Objective   Objective     Vitals:   Temp:  [98.6 °F (37 °C)] 98.6 °F (37 °C)  Heart Rate:  [103-122] 105  Resp:  [18] 18  BP: (127-160)/() 127/86  Physical Exam    Constitutional: Awake, alert   Eyes: PERRLA   HENT: NCAT, mucous membranes dry   Neck: Supple   Respiratory: Clear to auscultation bilaterally, nonlabored respirations    Cardiovascular: Regular rhythm   gastrointestinal: Positive bowel sounds, soft, generalized tenderness   Musculoskeletal: No bilateral ankle edema   Psychiatric: Appropriate affect, cooperative   Neurologic: Oriented x 3,   Skin: No rashes     Result Review    Result Review:  I have personally reviewed the results from the time of this admission to 5/18/2024 03:53 EDT and agree with these findings:  [x]  Laboratory list / accordion  []  Microbiology  [x]  Radiology  [x]  EKG/Telemetry   []  Cardiology/Vascular   []  Pathology  [x]  Old records  []  Other:    Initial lab work in the emergency department shows high sensitive troponin 11, 12, delta 1, chloride 95, glucose 383, total bilirubin 1.3, lipase 80, WBCs 19.47, all of the lab work is at baseline for the patient.  Chest x-ray shows no acute cardiopulmonary process.  CT abdomen pelvis with contrast shows no acute findings in the abdomen or pelvis.    Assessment & Plan   Assessment / Plan     Brief Patient Summary:  Raghav Loco is a 43 y.o. male who was admitted to the observation for further evaluation and treatment of his nausea vomiting.    Active Hospital Problems:  Active Hospital Problems    Diagnosis     **Nausea & vomiting      Plan:     Nausea vomiting  -Cardiac monitoring  -Vital signs every 4 hours  -Continuous pulse ox  -Normal saline at 150 mL/h  -Antiemetics as needed  -N.p.o.-advance diet as tolerated    Diabetes  -Accu-Cheks AC  -Adult subcutaneous insulin  management-moderate dose  -Hemoglobin A1c was 9.7 on 4/4/2024  -Patient is not on any medications for his diabetes at home      DVT prophylaxis:  Mechanical DVT prophylaxis orders are present.        CODE STATUS:    Code Status (Patient has no pulse and is not breathing): CPR (Attempt to Resuscitate)  Medical Interventions (Patient has pulse or is breathing): Full Support    Admission Status:  I believe this patient meets observation status.    78 minutes have been spent by [facility] providers in the care of this patient while under observation status.      Appropriate PPE worn during patient encounter.  Hand hygeine performed before and after seeing the patient.      Electronically signed by MALATHI Ramirez, 05/18/24, 3:35 AM EDT.

## 2024-05-18 NOTE — PLAN OF CARE
Goal Outcome Evaluation:   Patient admitted to Obs Unit for nausea ,vomiting and diarrhea for 2 days. CT of abd with contrast negative, chest xray negative. IVF infusing. Sinus Rhythm to monitor.  Afebrile. Vomiting and nausea subsided at this time. No episodes on obs unit. NPO. Alert and oriented x4. Drowsy but easy to arouse from medications. Family at bedside and both agree with plan of care.

## 2024-05-18 NOTE — PLAN OF CARE
Goal Outcome Evaluation:              Outcome Evaluation: Pt. 43 yr old Male. AOX4 and able to verbalized needs. IV removed. Discharged summary provided and education completed. Pt. instructed to follow up with GI and with his PCP in 1 week.   Pt aware to  his medication at his pharmacy of choice. Pt. gathered all his belongings and neither Pt. of his wife had any questions at the time of discharged.

## 2024-05-18 NOTE — DISCHARGE SUMMARY
ED OBSERVATION PROGRESS/DISCHARGE SUMMARY    Date of Admission: 5/17/2024   LOS: 0 days   PCP: Yonathan Allison MD    Final Diagnosis: Nausea and vomiting    Hospital Outcome:     Raghav Loco is a 43 y.o. male into the observation unit due to nausea and vomiting.  CT scan of abdomen and pelvis in the emergency department showed no acute findings.  He has been able to tolerate p.o. intake and is requesting to go home.  Of note he recently had a CVA when left the hospital AMA.  He is on dual antiplatelet therapy and a statin.  His most recent hemoglobin A1c is 9.7.  He reports that he has not been taking his diabetic medications.  I have started long-acting insulin and will discharge with a prescription for glipizide.  I have instructed the patient to follow-up with his primary care doctor.  He also reports he was recommended to have an EGD.  He says that he will contact his gastroenterologist on an outpatient basis.  He will be discharged home.    ROS:  General: no fevers, chills  Respiratory: no cough, dyspnea  Cardiovascular: no chest pain, palpitations  Abdomen: No abdominal pain, + nausea and vomiting, no diarrhea  Neurologic: No focal weakness    Objective   Physical Exam:  I have reviewed the vital signs.  Temp:  [98.2 °F (36.8 °C)-98.6 °F (37 °C)] 98.4 °F (36.9 °C)  Heart Rate:  [] 106  Resp:  [16-18] 16  BP: (111-160)/() 111/64  General Appearance:    Alert, cooperative, no distress  Head:    Normocephalic, atraumatic  Eyes:    Sclerae anicteric  Neck:   Supple, no mass  Lungs: Clear to auscultation bilaterally, respirations unlabored  Heart: Regular rate and rhythm, S1 and S2 normal, no murmur, rub or gallop  Abdomen:  Soft, non-tender, bowel sounds active, nondistended  Extremities: No clubbing, cyanosis, or edema to lower extremities  Pulses:  2+ and symmetric in distal lower extremities  Skin: No rashes   Neurologic: Oriented x3, Normal strength to extremities    Results Review:    I  have reviewed the labs, radiology results and diagnostic studies.    Results from last 7 days   Lab Units 05/18/24  1044   WBC 10*3/mm3 16.08*   HEMOGLOBIN g/dL 13.3   HEMATOCRIT % 40.4   PLATELETS 10*3/mm3 142     Results from last 7 days   Lab Units 05/18/24  1023 05/17/24  2335   SODIUM mmol/L 136 139   POTASSIUM mmol/L 3.3* 3.8   CHLORIDE mmol/L 104 95*   CO2 mmol/L 23.5 25.7   BUN mg/dL 15 15   CREATININE mg/dL 0.60* 0.79   CALCIUM mg/dL 8.2* 9.8   BILIRUBIN mg/dL  --  1.3*   ALK PHOS U/L  --  98   ALT (SGPT) U/L  --  18   AST (SGOT) U/L  --  8   GLUCOSE mg/dL 205* 383*     Imaging Results (Last 24 Hours)       Procedure Component Value Units Date/Time    CT Abdomen Pelvis With Contrast [[id number]] Collected: 05/18/24 0206     Updated: 05/18/24 0206    Narrative:        Patient: CLEMENT CRUZ  Time Out: 02:06  Exam(s): CT ABDOMEN + PELVIS With Contrast     EXAM:    CT Abdomen and Pelvis With Intravenous Contrast    CLINICAL HISTORY:     Reason for exam: Epigastric pain, nausea, vomiting, leukocytosis.    TECHNIQUE:    Axial computed tomography images of the abdomen and pelvis with   intravenous contrast.  CTDI is 7.42 mGy and DLP is 383.4 mGy-cm.  This CT   exam was performed according to the principle of ALARA (As Low As   Reasonably Achievable) by using one or more of the following dose   reduction techniques: automated exposure control, adjustment of the mA   and or kV according to patient size, and or use of iterative   reconstruction technique.    COMPARISON:    No relevant prior studies available.    FINDINGS:    Lung bases:  Unremarkable.  No mass.  No consolidation.     ABDOMEN:    Liver:  Unremarkable.  No mass.    Gallbladder and bile ducts:  Unremarkable.  No calcified stones.  No   ductal dilation.    Pancreas:  Unremarkable.  No mass.  No ductal dilation.    Spleen:  Unremarkable.  No splenomegaly.    Adrenals:  Unremarkable.  No mass.    Kidneys and ureters:  Unremarkable.  No solid mass.   No hydronephrosis.    Stomach and bowel:  Diverticulosis, without acute diverticulitis. No   small bowel obstruction. No free intraperitoneal air.     PELVIS:    Appendix:  No findings to suggest acute appendicitis.    Bladder:  Unremarkable.  No mass.    Reproductive:  Unremarkable as visualized.     ABDOMEN and PELVIS:    Intraperitoneal space:  Unremarkable.  No free air.  No significant   fluid collection.    Bones joints:  Degenerative changes of the spine.  No acute fracture.    No dislocation.    Soft tissues:  Unremarkable.    Vasculature:  Atherosclerotic changes of the aorta.  No abdominal   aortic aneurysm.    Lymph nodes:  Unremarkable.  No enlarged lymph nodes.    IMPRESSION:         No acute findings in the abdomen or pelvis.      Impression:          Electronically signed by Robin Kemp MD on 05-18-24 at 0206    XR Chest 2 View [[id number]] Collected: 05/18/24 0057     Updated: 05/18/24 0155    Narrative:        Patient: CLEMENT CRUZ  Time Out: 01:54  Exam(s): XR CXR 2 VIEWS     EXAM:    XR Chest, 2 Views    CLINICAL HISTORY:     Reason for exam: Chest Pain Triage Protocol.    TECHNIQUE:    Frontal and lateral views of the chest.    COMPARISON:    No relevant prior studies available.    FINDINGS:    Lungs:  No consolidation or mass.    Pleural space:  No effusion.    Heart:  No cardiomegaly.    Bones joints:  No acute findings.    IMPRESSION:         No acute cardiopulmonary process.      Impression:          Electronically signed by Yvrose Cha MD on 05-18-24 at 0154            I have reviewed the medications.  ---------------------------------------------------------------------------------------------  Assessment & Plan   Assessment/Problem List    Nausea & vomiting    Plan:    Nausea vomiting  -CT abdomen pelvis showed nothing acute  -He is tolerating p.o. intake  -Will discharge with Zofran as needed  -He will follow-up with his gastroenterologist Dr. Westfall as an outpatient      Diabetes  -Hemoglobin A1c was 9.7 on 4/4/2024  -Lantus 20 units daily  -Glipizide 5 mg p.o. twice daily  -Encouraged patient to follow-up with his primary care doctor  -Would likely benefit from SGLT2 inhibitor, will defer to his PCP     DVT prophylaxis:  Mechanical DVT prophylaxis orders are present.    Disposition: Home    Follow-up after Discharge: Primary care, gastroenterology    This note will serve as a discharge summary    Anais Negrete, MALATHI 05/18/24 15:03 EDT    I have worn appropriate PPE during this patient encounter, sanitized my hands both with entering and exiting patient's room.    32 minutes has been spent by [facility] providers in the care of this patient while under observation status

## 2024-05-18 NOTE — ED PROVIDER NOTES
" EMERGENCY DEPARTMENT ENCOUNTER      Room Number:  33/33  PCP: Yonathan Allison MD  Independent Historians: Patient  Patient Care Team:  Yonathan Allison MD as PCP - General (Internal Medicine)       HPI:  Chief Complaint: Chest pain    A complete HPI/ROS/PMH/PSH/SH/FH are unobtainable due to: None    Chronic or social conditions impacting patient care (Social Determinants of Health): None      Context: Raghav Loco is a 43 y.o. male with a PMH significant for acute CVA, CAD, diabetes who presents to the ED c/o acute nausea and vomiting since Wednesday.  The patient reports that he is scheduled for an endoscopy for further evaluation for GERD and reflux symptoms that have been increasing recently.  Symptoms have increased furthermore since Wednesday and he reports multiple episodes of vomiting each day with a burning sensation in his chest.  He also reports of history of \"heart attack\" and wanted to make sure that his heart was okay as well.  No development of cough, fever, chills.  He has been compliant with his home medications for reflux.      Upon review of prior external notes (non-ED) -and- Review of prior external test results outside of this encounter it appears the patient was evaluated in the office with cardiology for acute CVA on April 15, 2024.  The patient had a normal sodium on 4/11/2024 and a normal sodium 1/6/2024.      PAST MEDICAL HISTORY  Active Ambulatory Problems     Diagnosis Date Noted    Acute CVA (cerebrovascular accident) 04/03/2024     Resolved Ambulatory Problems     Diagnosis Date Noted    No Resolved Ambulatory Problems     Past Medical History:   Diagnosis Date    Coronary artery disease     Diabetes mellitus          PAST SURGICAL HISTORY  Past Surgical History:   Procedure Laterality Date    CORONARY ANGIOPLASTY WITH STENT PLACEMENT           FAMILY HISTORY  No family history on file.      SOCIAL HISTORY  Social History     Socioeconomic History    Marital status: Single "   Tobacco Use    Smoking status: Every Day     Current packs/day: 1.00     Average packs/day: 1 pack/day for 20.1 years (20.1 ttl pk-yrs)     Types: Cigarettes     Start date: 4/4/2004    Smokeless tobacco: Never   Vaping Use    Vaping status: Never Used   Substance and Sexual Activity    Alcohol use: Never    Drug use: Never    Sexual activity: Defer         ALLERGIES  Patient has no known allergies.      REVIEW OF SYSTEMS  Included in HPI  All systems reviewed and negative except for those discussed in HPI.      PHYSICAL EXAM    I have reviewed the triage vital signs and nursing notes.    ED Triage Vitals   Temp Heart Rate Resp BP SpO2   05/17/24 2308 05/17/24 2308 05/17/24 2308 05/17/24 2310 05/17/24 2308   98.6 °F (37 °C) (!) 122 18 (!) 160/103 99 %      Temp src Heart Rate Source Patient Position BP Location FiO2 (%)   05/17/24 2308 05/17/24 2308 05/17/24 2310 05/17/24 2310 --   Tympanic Monitor Sitting Right arm        Physical Exam  Constitutional:       General: He is not in acute distress.     Appearance: He is well-developed. He is ill-appearing. He is not toxic-appearing.   HENT:      Head: Normocephalic and atraumatic.   Eyes:      General: No scleral icterus.     Conjunctiva/sclera: Conjunctivae normal.   Neck:      Trachea: No tracheal deviation.   Cardiovascular:      Rate and Rhythm: Regular rhythm. Tachycardia present.   Pulmonary:      Effort: Pulmonary effort is normal.      Breath sounds: Normal breath sounds.   Abdominal:      Palpations: Abdomen is soft.      Tenderness: There is no abdominal tenderness. There is no guarding.   Musculoskeletal:         General: No deformity.      Cervical back: Normal range of motion.   Lymphadenopathy:      Cervical: No cervical adenopathy.   Skin:     General: Skin is warm and dry.   Neurological:      Mental Status: He is alert and oriented to person, place, and time.   Psychiatric:         Behavior: Behavior normal.         Vital signs and nursing notes  reviewed.      PPE: I wore a surgical mask throughout my encounters with the pt. I performed hand hygiene on entry into the pt room and upon exit.      LAB RESULTS  Recent Results (from the past 24 hour(s))   ECG 12 Lead ED Triage Standing Order; Chest Pain    Collection Time: 05/17/24 11:18 PM   Result Value Ref Range    QT Interval 346 ms    QTC Interval 438 ms   Comprehensive Metabolic Panel    Collection Time: 05/17/24 11:35 PM    Specimen: Blood   Result Value Ref Range    Glucose 383 (H) 65 - 99 mg/dL    BUN 15 6 - 20 mg/dL    Creatinine 0.79 0.76 - 1.27 mg/dL    Sodium 139 136 - 145 mmol/L    Potassium 3.8 3.5 - 5.2 mmol/L    Chloride 95 (L) 98 - 107 mmol/L    CO2 25.7 22.0 - 29.0 mmol/L    Calcium 9.8 8.6 - 10.5 mg/dL    Total Protein 8.0 6.0 - 8.5 g/dL    Albumin 4.9 3.5 - 5.2 g/dL    ALT (SGPT) 18 1 - 41 U/L    AST (SGOT) 8 1 - 40 U/L    Alkaline Phosphatase 98 39 - 117 U/L    Total Bilirubin 1.3 (H) 0.0 - 1.2 mg/dL    Globulin 3.1 gm/dL    A/G Ratio 1.6 g/dL    BUN/Creatinine Ratio 19.0 7.0 - 25.0    Anion Gap 18.3 (H) 5.0 - 15.0 mmol/L    eGFR 111.6 >60.0 mL/min/1.73   High Sensitivity Troponin T    Collection Time: 05/17/24 11:35 PM    Specimen: Blood   Result Value Ref Range    HS Troponin T 11 <22 ng/L   Green Top (Gel)    Collection Time: 05/17/24 11:35 PM   Result Value Ref Range    Extra Tube Hold for add-ons.    Lavender Top    Collection Time: 05/17/24 11:35 PM   Result Value Ref Range    Extra Tube hold for add-on    Gold Top - SST    Collection Time: 05/17/24 11:35 PM   Result Value Ref Range    Extra Tube Hold for add-ons.    Light Blue Top    Collection Time: 05/17/24 11:35 PM   Result Value Ref Range    Extra Tube Hold for add-ons.    CBC Auto Differential    Collection Time: 05/17/24 11:35 PM    Specimen: Blood   Result Value Ref Range    WBC 19.47 (H) 3.40 - 10.80 10*3/mm3    RBC 5.58 4.14 - 5.80 10*6/mm3    Hemoglobin 16.8 13.0 - 17.7 g/dL    Hematocrit 50.1 37.5 - 51.0 %    MCV 89.8 79.0  - 97.0 fL    MCH 30.1 26.6 - 33.0 pg    MCHC 33.5 31.5 - 35.7 g/dL    RDW 11.9 (L) 12.3 - 15.4 %    RDW-SD 38.8 37.0 - 54.0 fl    MPV 12.3 (H) 6.0 - 12.0 fL    Platelets 187 140 - 450 10*3/mm3    Neutrophil % 86.3 (H) 42.7 - 76.0 %    Lymphocyte % 5.4 (L) 19.6 - 45.3 %    Monocyte % 7.4 5.0 - 12.0 %    Eosinophil % 0.1 (L) 0.3 - 6.2 %    Basophil % 0.4 0.0 - 1.5 %    Immature Grans % 0.4 0.0 - 0.5 %    Neutrophils, Absolute 16.81 (H) 1.70 - 7.00 10*3/mm3    Lymphocytes, Absolute 1.06 0.70 - 3.10 10*3/mm3    Monocytes, Absolute 1.44 (H) 0.10 - 0.90 10*3/mm3    Eosinophils, Absolute 0.01 0.00 - 0.40 10*3/mm3    Basophils, Absolute 0.07 0.00 - 0.20 10*3/mm3    Immature Grans, Absolute 0.08 (H) 0.00 - 0.05 10*3/mm3    nRBC 0.0 0.0 - 0.2 /100 WBC   Lipase    Collection Time: 05/17/24 11:35 PM    Specimen: Blood   Result Value Ref Range    Lipase 80 (H) 13 - 60 U/L   Blood Gas, Venous -    Collection Time: 05/18/24 12:40 AM    Specimen: Venous Blood   Result Value Ref Range    pH, Venous 7.426 (H) 7.310 - 7.410 pH Units    pCO2, Venous 42.4 41.0 - 51.0 mm Hg    pO2, Venous 30.8 (L) 35.0 - 45.0 mm Hg    HCO3, Venous 27.9 22.0 - 28.0 mmol/L    Base Excess, Venous 3.0 (H) -2.0 - 2.0 mmol/L    O2 Saturation, Venous 60.4 45.0 - 75.0 %    CO2 Content 29.2 (H) 23 - 27 mmol/L    Barometric Pressure for Blood Gas 744.5000 mmHg    Modality Room Air     FIO2 21 %    Rate 24 Breaths/minute   High Sensitivity Troponin T 2Hr    Collection Time: 05/18/24  2:03 AM    Specimen: Blood   Result Value Ref Range    HS Troponin T 12 <22 ng/L    Troponin T Delta 1 >=-4 - <+4 ng/L         RADIOLOGY  CT Abdomen Pelvis With Contrast    Result Date: 5/18/2024  Patient: CLEMENT CRUZ  Time Out: 02:06 Exam(s): CT ABDOMEN + PELVIS With Contrast EXAM:   CT Abdomen and Pelvis With Intravenous Contrast CLINICAL HISTORY:    Reason for exam: Epigastric pain, nausea, vomiting, leukocytosis. TECHNIQUE:   Axial computed tomography images of the  abdomen and pelvis with intravenous contrast.  CTDI is 7.42 mGy and DLP is 383.4 mGy-cm.  This CT exam was performed according to the principle of ALARA (As Low As Reasonably Achievable) by using one or more of the following dose reduction techniques: automated exposure control, adjustment of the mA and or kV according to patient size, and or use of iterative reconstruction technique. COMPARISON:   No relevant prior studies available. FINDINGS:   Lung bases:  Unremarkable.  No mass.  No consolidation.  ABDOMEN:   Liver:  Unremarkable.  No mass.   Gallbladder and bile ducts:  Unremarkable.  No calcified stones.  No ductal dilation.   Pancreas:  Unremarkable.  No mass.  No ductal dilation.   Spleen:  Unremarkable.  No splenomegaly.   Adrenals:  Unremarkable.  No mass.   Kidneys and ureters:  Unremarkable.  No solid mass.  No hydronephrosis.   Stomach and bowel:  Diverticulosis, without acute diverticulitis. No small bowel obstruction. No free intraperitoneal air.  PELVIS:   Appendix:  No findings to suggest acute appendicitis.   Bladder:  Unremarkable.  No mass.   Reproductive:  Unremarkable as visualized.  ABDOMEN and PELVIS:   Intraperitoneal space:  Unremarkable.  No free air.  No significant fluid collection.   Bones joints:  Degenerative changes of the spine.  No acute fracture.  No dislocation.   Soft tissues:  Unremarkable.   Vasculature:  Atherosclerotic changes of the aorta.  No abdominal aortic aneurysm.   Lymph nodes:  Unremarkable.  No enlarged lymph nodes. IMPRESSION:       No acute findings in the abdomen or pelvis.     Electronically signed by Robin Kemp MD on 05-18-24 at 0206    XR Chest 2 View    Result Date: 5/18/2024  Patient: CLEMENT CRUZ  Time Out: 01:54 Exam(s): XR CXR 2 VIEWS EXAM:   XR Chest, 2 Views CLINICAL HISTORY:    Reason for exam: Chest Pain Triage Protocol. TECHNIQUE:   Frontal and lateral views of the chest. COMPARISON:   No relevant prior studies available. FINDINGS:    Lungs:  No consolidation or mass.   Pleural space:  No effusion.   Heart:  No cardiomegaly.   Bones joints:  No acute findings. IMPRESSION:       No acute cardiopulmonary process.     Electronically signed by Yvrose Cha MD on 05-18-24 at 0154       MEDICATIONS GIVEN IN ER  Medications   sodium chloride 0.9 % flush 10 mL (has no administration in time range)   aspirin tablet 325 mg (325 mg Oral Not Given 5/18/24 0105)   ondansetron (ZOFRAN) injection 4 mg (has no administration in time range)   sodium chloride 0.9 % bolus 500 mL (has no administration in time range)   aluminum-magnesium hydroxide-simethicone (MAALOX MAX) 400-400-40 MG/5ML suspension 15 mL (15 mL Oral Given 5/18/24 0016)   famotidine (PEPCID) injection 20 mg (20 mg Intravenous Given 5/18/24 0018)   sodium chloride 0.9 % bolus 1,000 mL (1,000 mL Intravenous New Bag 5/18/24 0017)   droperidol (INAPSINE) injection 2.5 mg (2.5 mg Intravenous Given 5/18/24 0018)   iopamidol (ISOVUE-300) 61 % injection 100 mL (85 mL Intravenous Given 5/18/24 0046)         ORDERS PLACED DURING THIS VISIT:  Orders Placed This Encounter   Procedures    XR Chest 2 View    CT Abdomen Pelvis With Contrast    [place] Draw    Comprehensive Metabolic Panel    High Sensitivity Troponin T    CBC Auto Differential    Lipase    Blood Gas, Venous -    High Sensitivity Troponin T 2Hr    Blood Gas, Venous -    NPO Diet NPO Type: Strict NPO    Undress & Gown    Continuous Pulse Oximetry    Oxygen Therapy- Nasal Cannula; Titrate 1-6 LPM Per SpO2; 90 - 95%    ECG 12 Lead ED Triage Standing Order; Chest Pain    ECG 12 Lead ED Triage Standing Order; Chest Pain    Insert Peripheral IV    CBC & Differential    Green Top (Gel)    Lavender Top    Gold Top - SST    Light Blue Top         OUTPATIENT MEDICATION MANAGEMENT:  Current Facility-Administered Medications Ordered in Epic   Medication Dose Route Frequency Provider Last Rate Last Admin    aspirin tablet 325 mg  325 mg Oral Giorgi Randolph  Anjel CHACON MD        ondansetron (ZOFRAN) injection 4 mg  4 mg Intravenous Once Gene Sandoval PA        sodium chloride 0.9 % bolus 500 mL  500 mL Intravenous Once Gene Sandoval PA        sodium chloride 0.9 % flush 10 mL  10 mL Intravenous PRN Anjel Randolph MD         Current Outpatient Medications Ordered in Epic   Medication Sig Dispense Refill    aspirin 81 MG EC tablet Take 1 tablet by mouth Daily. 90 tablet 3    atorvastatin (LIPITOR) 80 MG tablet Take 1 tablet by mouth Daily. 90 tablet 3    clopidogrel (PLAVIX) 75 MG tablet Take 1 tablet by mouth Daily. 90 tablet 3    losartan (Cozaar) 25 MG tablet Take 1 tablet by mouth Daily. 90 tablet 3    meloxicam (MOBIC) 15 MG tablet Take 1 tablet by mouth Daily. 15 tablet 0    metoprolol succinate XL (TOPROL-XL) 25 MG 24 hr tablet Take 1 tablet by mouth Daily. 90 tablet 3    ondansetron (ZOFRAN) 4 MG tablet Take 1 tablet by mouth.             PROGRESS, DATA ANALYSIS, CONSULTS, AND MEDICAL DECISION MAKING  All labs have been independently interpreted by me.  All radiology studies have been reviewed by me. All EKG's have been independently viewed and interpreted by me.  Discussion below represents my analysis of pertinent findings related to patient's condition, differential diagnosis, treatment plan and final disposition.      DIFFERENTIAL DIAGNOSIS INCLUDE BUT NOT LIMITED TO:     Differential diagnosis includes but is not limited to:  - Hepatobiliary pathology such as cholecystitis, cholangitis, and symptomatic cholelithiasis  - Pancreatitis  - Dyspepsia  - Small bowel obstruction  - Appendicitis  - Diverticulitis  - UTI including pyelonephritis  - Ureteral stone  - Zoster  - Colitis, including infectious and ischemic  - Atypical ACS    Clinical Scores: N/A      ED Course as of 05/18/24 0254   Fri May 17, 2024   2353 WBC(!): 19.47 [DC]   2354 Heart Rate(!): 122 [DC]   Sat May 18, 2024   0032 Anion Gap(!): 18.3 [DC]   0249 Patient presentation and evaluation  consistent with acute nausea and vomiting, gastritis.  He has leukocytosis and persistent tachycardia despite fluids and symptom control and I feel he would be most appropriately admitted to the observation unit for continued symptom control and fluids today.  Patient agreeable with this plan and all questions answered. [DC]   0253 I discussed the case with SHER Murphy at this time regarding the patient.  I discussed work-up, results, concerns.  I discussed the consulting provider's desire for observation admission.   [DC]      ED Course User Index  [DC] Gene Sandoval PA         0254 I rechecked the patient.  I discussed the patient's labs, radiology findings (including all incidental findings), diagnosis, and plan for admission. The patient understands and agrees with the plan.      AS OF 02:54 EDT VITALS:    BP - 131/91  HR - 108  TEMP - 98.6 °F (37 °C) (Tympanic)  O2 SATS - 94%    COMPLEXITY OF CARE  The patient requires admission.      DIAGNOSIS  Final diagnoses:   Intractable nausea and vomiting   Poorly controlled diabetes mellitus   Leukocytosis, unspecified type         DISPOSITION  ED Disposition       ED Disposition   Decision to Admit    Condition   --    Comment   --                Please note that portions of this document were completed with a voice recognition program.    Note Disclaimer: At [facility], we believe that sharing information builds trust and better relationships. You are receiving this note because you recently visited [facility]. It is possible you will see health information before a provider has talked with you about it. This kind of information can be easy to misunderstand. To help you fully understand what it means for your health, we urge you to discuss this note with your provider.         Gene Sandoval PA  05/18/24 0254

## 2024-06-11 ENCOUNTER — TELEPHONE (OUTPATIENT)
Dept: CARDIOLOGY | Facility: CLINIC | Age: 44
End: 2024-06-11
Payer: COMMERCIAL

## 2024-06-11 NOTE — TELEPHONE ENCOUNTER
Received a fax for Cardiac Clearance from [facility] for EGD under general anesthesia and possible BX.  They would like the pt to hold Plavix 5 days prior if possible.  Pt can remain on ASA.  Looks the pt has h/o CVA, PCI to LAD May 2021 and PFO.

## 2024-06-11 NOTE — TELEPHONE ENCOUNTER
Spoke with pt.      No Chest pain  No SOA  No palpitations    Pt is aware if any sx or stroke like sx then he needs to go to the ER.    Clearance note is on your desk to sign.

## 2024-06-11 NOTE — TELEPHONE ENCOUNTER
It appears he is having worsening GI issues with nausea, vomiting, abdominal pain.  OK for procedure and to hold Plavix as directed.  Would restart Plavix as soon as possible from a procedural standpoint.  He is moderate to high risk but this is not modifiable.  If he develops chest pain, worsening shortness of breath, or any strokelike symptoms he should proceed to ER immediately for evaluation.

## 2024-06-12 NOTE — TELEPHONE ENCOUNTER
I just saw her note.  I printed out the office note from GI and wrote on the clearance that per their notes, he did not need to hold Plavix prior to procedure.  I also put on the clearance that he needs clearance from neurology for general anesthesia prior to procedure.  I am unsure why his procedure was scheduled so soon other than she does mention if he did not have improvement with adding Carafate, she would want to do it sooner.

## 2024-07-17 ENCOUNTER — TELEPHONE (OUTPATIENT)
Dept: NEUROLOGY | Facility: CLINIC | Age: 44
End: 2024-07-17
Payer: COMMERCIAL

## 2024-07-17 ENCOUNTER — TELEPHONE (OUTPATIENT)
Dept: CARDIOLOGY | Facility: CLINIC | Age: 44
End: 2024-07-17
Payer: COMMERCIAL

## 2024-07-17 NOTE — TELEPHONE ENCOUNTER
Pt was just seen by you on the 10th.  Pt is on Plavix for stroke.  He will need clearance from them correct?

## 2024-07-17 NOTE — TELEPHONE ENCOUNTER
He has to get clearance from Neurology as well because of recent stroke. Plavix is due to cardiac stents, but we had already cleared him from a cardiac standpoint. Can you verify with GI?

## 2024-07-17 NOTE — TELEPHONE ENCOUNTER
Caller: Raghav Loco    Relationship: Self    Best call back number: [phone]    What was the call regarding: PT IS REQUESTING DR CRUZ PLACE AN ORDER FOR A STRESS TEST, HE IS NEEDING TO GET AN EGD SCOPE DONE AND BEFORE THEY WILL DO THE PROCEDURE HE HAS TO HAVE A STRESS TEST.

## 2024-07-17 NOTE — TELEPHONE ENCOUNTER
Yes, cardiac clearance stands as long as no new symptoms.  We cannot clear him for anesthesia due to recent stroke and this must come from neurology.

## 2024-07-17 NOTE — TELEPHONE ENCOUNTER
Spoke with pt.  He stopped Plavix on his own a few weeks ago.  Pt said he did not know who his Neuro was.  Gave him the number to Dr Forbes's group as it looks like MALATHI saw in the hospital.  Strongly advised pt that he has to call them once we hang up to let them know what he did.  Advised him that we sent Cardiac Clearance on 6/20/2024 and this should not be an issue.  Pt stated that he did not have ANY chest pain, no SOA, and no palpitations.  Cardiac Clearance stands, correct?  BUT he must be cleared from Neuro? Pt verbalized understanding and will promptly call them now.

## 2024-07-17 NOTE — TELEPHONE ENCOUNTER
Caller: Raghav Loco    Relationship to patient: Self    Best call back number: [phone]    New or established patient?  [x] New  [] Established    Date of discharge: 5/18/24    Facility discharged from: [facility]    Diagnosis/Symptoms: Acute left cerebellar stroke     Length of stay (If applicable): 2 DAYS    Specialty Only: Did you see a [facility] provider?    [x] Yes  [] No  If so, who? DR SAUNDERS

## 2024-07-26 NOTE — TELEPHONE ENCOUNTER
Called patient to schedule an appointment with Dr. Grey in August or Dr. Forbes in September.  He states that he needs to be seen sooner than that.  Explained to him that neither provider will be in clinic before 8/12/2024 due to the hospital call schedule.  He then ended the phone call.